# Patient Record
Sex: MALE | Race: WHITE | Employment: FULL TIME | URBAN - METROPOLITAN AREA
[De-identification: names, ages, dates, MRNs, and addresses within clinical notes are randomized per-mention and may not be internally consistent; named-entity substitution may affect disease eponyms.]

---

## 2018-03-09 ENCOUNTER — OFFICE VISIT (OUTPATIENT)
Dept: INTERNAL MEDICINE CLINIC | Facility: CLINIC | Age: 43
End: 2018-03-09

## 2018-03-09 VITALS
OXYGEN SATURATION: 98 % | TEMPERATURE: 99 F | SYSTOLIC BLOOD PRESSURE: 182 MMHG | HEIGHT: 77 IN | RESPIRATION RATE: 16 BRPM | WEIGHT: 315 LBS | BODY MASS INDEX: 37.19 KG/M2 | DIASTOLIC BLOOD PRESSURE: 120 MMHG | HEART RATE: 93 BPM

## 2018-03-09 DIAGNOSIS — Z00.00 LABORATORY EXAMINATION ORDERED AS PART OF A ROUTINE GENERAL MEDICAL EXAMINATION: ICD-10-CM

## 2018-03-09 DIAGNOSIS — I10 ESSENTIAL HYPERTENSION: Primary | ICD-10-CM

## 2018-03-09 PROCEDURE — 99203 OFFICE O/P NEW LOW 30 MIN: CPT | Performed by: INTERNAL MEDICINE

## 2018-03-09 RX ORDER — METOPROLOL SUCCINATE 50 MG/1
50 TABLET, EXTENDED RELEASE ORAL DAILY
Qty: 30 TABLET | Refills: 2 | Status: SHIPPED | OUTPATIENT
Start: 2018-03-09 | End: 2018-03-26

## 2018-03-09 NOTE — PROGRESS NOTES
Scott Wegener JUAN 1975 is a 43year old male.     Patient presents with:  Blood Pressure       HPI:   Incidental finding of elevated blood pressure at the [employer] physical in January    Current Outpatient Prescriptions:  Metoprolol Succinate ER 50 MG Oral A1C; Future  -     LIPID PANEL; Future  -     COMP METABOLIC PANEL (14); Future  -     CBC WITH DIFFERENTIAL WITH PLATELET;  Future  -     URINALYSIS, ROUTINE; Future        Patient Instructions   That up CPX after blood testsset       The patient indicates

## 2018-03-14 ENCOUNTER — LAB ENCOUNTER (OUTPATIENT)
Dept: LAB | Age: 43
End: 2018-03-14
Attending: INTERNAL MEDICINE
Payer: COMMERCIAL

## 2018-03-14 DIAGNOSIS — Z00.00 LABORATORY EXAMINATION ORDERED AS PART OF A ROUTINE GENERAL MEDICAL EXAMINATION: ICD-10-CM

## 2018-03-14 LAB
ALBUMIN SERPL-MCNC: 4 G/DL (ref 3.5–4.8)
ALP LIVER SERPL-CCNC: 89 U/L (ref 45–117)
ALT SERPL-CCNC: 57 U/L (ref 17–63)
AST SERPL-CCNC: 32 U/L (ref 15–41)
BASOPHILS # BLD AUTO: 0.03 X10(3) UL (ref 0–0.1)
BASOPHILS NFR BLD AUTO: 0.4 %
BILIRUB SERPL-MCNC: 0.5 MG/DL (ref 0.1–2)
BILIRUB UR QL STRIP.AUTO: NEGATIVE
BUN BLD-MCNC: 14 MG/DL (ref 8–20)
CALCIUM BLD-MCNC: 9.4 MG/DL (ref 8.3–10.3)
CHLORIDE: 101 MMOL/L (ref 101–111)
CHOLEST SMN-MCNC: 163 MG/DL (ref ?–200)
CLARITY UR REFRACT.AUTO: CLEAR
CO2: 27 MMOL/L (ref 22–32)
COLOR UR AUTO: YELLOW
CREAT BLD-MCNC: 0.99 MG/DL (ref 0.7–1.3)
EOSINOPHIL # BLD AUTO: 0.07 X10(3) UL (ref 0–0.3)
EOSINOPHIL NFR BLD AUTO: 1 %
ERYTHROCYTE [DISTWIDTH] IN BLOOD BY AUTOMATED COUNT: 11.9 % (ref 11.5–16)
GLUCOSE BLD-MCNC: 87 MG/DL (ref 70–99)
GLUCOSE UR STRIP.AUTO-MCNC: NEGATIVE MG/DL
HCT VFR BLD AUTO: 48.6 % (ref 37–53)
HDLC SERPL-MCNC: 35 MG/DL (ref 45–?)
HDLC SERPL: 4.66 {RATIO} (ref ?–4.97)
HGB BLD-MCNC: 16.3 G/DL (ref 13–17)
IMMATURE GRANULOCYTE COUNT: 0.02 X10(3) UL (ref 0–1)
IMMATURE GRANULOCYTE RATIO %: 0.3 %
LDLC SERPL CALC-MCNC: 98 MG/DL (ref ?–130)
LYMPHOCYTES # BLD AUTO: 1.82 X10(3) UL (ref 0.9–4)
LYMPHOCYTES NFR BLD AUTO: 26.1 %
M PROTEIN MFR SERPL ELPH: 8.1 G/DL (ref 6.1–8.3)
MCH RBC QN AUTO: 31 PG (ref 27–33.2)
MCHC RBC AUTO-ENTMCNC: 33.5 G/DL (ref 31–37)
MCV RBC AUTO: 92.4 FL (ref 80–99)
MONOCYTES # BLD AUTO: 0.62 X10(3) UL (ref 0.1–1)
MONOCYTES NFR BLD AUTO: 8.9 %
NEUTROPHIL ABS PRELIM: 4.4 X10 (3) UL (ref 1.3–6.7)
NEUTROPHILS # BLD AUTO: 4.4 X10(3) UL (ref 1.3–6.7)
NEUTROPHILS NFR BLD AUTO: 63.3 %
NITRITE UR QL STRIP.AUTO: NEGATIVE
NONHDLC SERPL-MCNC: 128 MG/DL (ref ?–130)
PH UR STRIP.AUTO: 5 [PH] (ref 4.5–8)
PLATELET # BLD AUTO: 203 10(3)UL (ref 150–450)
POTASSIUM SERPL-SCNC: 4.4 MMOL/L (ref 3.6–5.1)
PROT UR STRIP.AUTO-MCNC: NEGATIVE MG/DL
RBC # BLD AUTO: 5.26 X10(6)UL (ref 4.3–5.7)
RED CELL DISTRIBUTION WIDTH-SD: 40.4 FL (ref 35.1–46.3)
SODIUM SERPL-SCNC: 136 MMOL/L (ref 136–144)
SP GR UR STRIP.AUTO: 1.02 (ref 1–1.03)
THYROXINE (T4): 8.5 UG/DL (ref 4.5–10.9)
TRIGL SERPL-MCNC: 148 MG/DL (ref ?–150)
TSI SER-ACNC: 2.78 MIU/ML (ref 0.35–5.5)
UROBILINOGEN UR STRIP.AUTO-MCNC: <2 MG/DL
VLDLC SERPL CALC-MCNC: 30 MG/DL (ref 5–40)
WBC # BLD AUTO: 7 X10(3) UL (ref 4–13)

## 2018-03-14 PROCEDURE — 84436 ASSAY OF TOTAL THYROXINE: CPT | Performed by: INTERNAL MEDICINE

## 2018-03-14 PROCEDURE — 83036 HEMOGLOBIN GLYCOSYLATED A1C: CPT | Performed by: INTERNAL MEDICINE

## 2018-03-14 PROCEDURE — 36415 COLL VENOUS BLD VENIPUNCTURE: CPT | Performed by: INTERNAL MEDICINE

## 2018-03-14 PROCEDURE — 80050 GENERAL HEALTH PANEL: CPT | Performed by: INTERNAL MEDICINE

## 2018-03-14 PROCEDURE — 80061 LIPID PANEL: CPT | Performed by: INTERNAL MEDICINE

## 2018-03-14 PROCEDURE — 81001 URINALYSIS AUTO W/SCOPE: CPT | Performed by: INTERNAL MEDICINE

## 2018-03-15 LAB
EST. AVERAGE GLUCOSE BLD GHB EST-MCNC: 123 MG/DL (ref 68–126)
HBA1C MFR BLD HPLC: 5.9 % (ref ?–5.7)

## 2018-03-20 ENCOUNTER — TELEPHONE (OUTPATIENT)
Dept: INTERNAL MEDICINE CLINIC | Facility: CLINIC | Age: 43
End: 2018-03-20

## 2018-03-26 ENCOUNTER — OFFICE VISIT (OUTPATIENT)
Dept: INTERNAL MEDICINE CLINIC | Facility: CLINIC | Age: 43
End: 2018-03-26

## 2018-03-26 VITALS
SYSTOLIC BLOOD PRESSURE: 170 MMHG | TEMPERATURE: 99 F | HEART RATE: 74 BPM | DIASTOLIC BLOOD PRESSURE: 110 MMHG | BODY MASS INDEX: 37.19 KG/M2 | HEIGHT: 77 IN | RESPIRATION RATE: 16 BRPM | WEIGHT: 315 LBS | OXYGEN SATURATION: 94 %

## 2018-03-26 DIAGNOSIS — Z00.00 ROUTINE GENERAL MEDICAL EXAMINATION AT A HEALTH CARE FACILITY: Primary | ICD-10-CM

## 2018-03-26 DIAGNOSIS — R82.90 ABNORMAL URINE: ICD-10-CM

## 2018-03-26 DIAGNOSIS — I10 ESSENTIAL HYPERTENSION: ICD-10-CM

## 2018-03-26 PROCEDURE — 93000 ELECTROCARDIOGRAM COMPLETE: CPT | Performed by: INTERNAL MEDICINE

## 2018-03-26 PROCEDURE — 99396 PREV VISIT EST AGE 40-64: CPT | Performed by: INTERNAL MEDICINE

## 2018-03-26 RX ORDER — TRIAMTERENE AND HYDROCHLOROTHIAZIDE 37.5; 25 MG/1; MG/1
CAPSULE ORAL
Qty: 30 CAPSULE | Refills: 11 | Status: SHIPPED | OUTPATIENT
Start: 2018-03-26 | End: 2018-04-12

## 2018-03-26 RX ORDER — METOPROLOL SUCCINATE 50 MG/1
100 TABLET, EXTENDED RELEASE ORAL DAILY
Qty: 60 TABLET | Refills: 2 | COMMUNITY
Start: 2018-03-26 | End: 2018-04-12

## 2018-03-26 NOTE — PROGRESS NOTES
Scott Wegener JUAN 1975 is a 43year old male. Patient presents with:  Physical      HPI:    see below     Current Outpatient Prescriptions:  Metoprolol Succinate ER 50 MG Oral Tablet 24 Hr Take 2 tablets (100 mg total) by mouth daily.  Disp: 60 table Patient denies blood in urine, burning on urination, difficulty urinating, urinary frequency , urinary incontinence/no history of kidney disease or genital abnormalities. Dysuria none. Nocturia None.    Musculoskeletal:   Patient denies arthritis , back p Varicose veins: absent .    MUSCULOSKELETAL:   Cervical spines: normal.   L-S spines: normal.   Lower extremity joints: normal.   Upper extremity joints: normal.   NEUROLOGICAL:   Cognitive function  Mood /affect -thought :perception :normal  Appearance-o

## 2018-03-29 ENCOUNTER — APPOINTMENT (OUTPATIENT)
Dept: LAB | Age: 43
End: 2018-03-29
Attending: INTERNAL MEDICINE
Payer: COMMERCIAL

## 2018-03-29 DIAGNOSIS — R82.90 ABNORMAL URINE: ICD-10-CM

## 2018-03-29 PROCEDURE — 81003 URINALYSIS AUTO W/O SCOPE: CPT | Performed by: INTERNAL MEDICINE

## 2018-03-29 PROCEDURE — 87086 URINE CULTURE/COLONY COUNT: CPT | Performed by: INTERNAL MEDICINE

## 2018-03-30 LAB
BILIRUB UR QL STRIP.AUTO: NEGATIVE
CLARITY UR REFRACT.AUTO: CLEAR
COLOR UR AUTO: YELLOW
GLUCOSE UR STRIP.AUTO-MCNC: NEGATIVE MG/DL
KETONES UR STRIP.AUTO-MCNC: NEGATIVE MG/DL
LEUKOCYTE ESTERASE UR QL STRIP.AUTO: NEGATIVE
NITRITE UR QL STRIP.AUTO: NEGATIVE
PH UR STRIP.AUTO: 8 [PH] (ref 4.5–8)
PROT UR STRIP.AUTO-MCNC: NEGATIVE MG/DL
RBC UR QL AUTO: NEGATIVE
SP GR UR STRIP.AUTO: 1.01 (ref 1–1.03)
UROBILINOGEN UR STRIP.AUTO-MCNC: <2 MG/DL

## 2018-04-12 ENCOUNTER — OFFICE VISIT (OUTPATIENT)
Dept: INTERNAL MEDICINE CLINIC | Facility: CLINIC | Age: 43
End: 2018-04-12

## 2018-04-12 VITALS
SYSTOLIC BLOOD PRESSURE: 166 MMHG | HEIGHT: 77 IN | HEART RATE: 102 BPM | OXYGEN SATURATION: 96 % | TEMPERATURE: 99 F | BODY MASS INDEX: 37.19 KG/M2 | RESPIRATION RATE: 20 BRPM | WEIGHT: 315 LBS | DIASTOLIC BLOOD PRESSURE: 110 MMHG

## 2018-04-12 DIAGNOSIS — I10 ESSENTIAL HYPERTENSION: ICD-10-CM

## 2018-04-12 PROCEDURE — 99213 OFFICE O/P EST LOW 20 MIN: CPT | Performed by: INTERNAL MEDICINE

## 2018-04-12 RX ORDER — TRIAMTERENE AND HYDROCHLOROTHIAZIDE 37.5; 25 MG/1; MG/1
CAPSULE ORAL
Qty: 30 CAPSULE | Refills: 11 | COMMUNITY
Start: 2018-04-12 | End: 2018-05-17

## 2018-04-12 RX ORDER — METOPROLOL SUCCINATE 50 MG/1
200 TABLET, EXTENDED RELEASE ORAL DAILY
Qty: 60 TABLET | Refills: 2 | COMMUNITY
Start: 2018-04-12 | End: 2018-04-14

## 2018-04-12 NOTE — PROGRESS NOTES
Scott Wegener JUAN 1975 is a 43year old male. Patient presents with:   Follow - Up: Est pt. blood pressure follow up       HPI:   bp ck     Current Outpatient Prescriptions:  Metoprolol Succinate ER 50 MG Oral Tablet 24 Hr Take 4 tablets (200 mg to 4/14/2018). Saran Mckay MD

## 2018-04-14 ENCOUNTER — OFFICE VISIT (OUTPATIENT)
Dept: INTERNAL MEDICINE CLINIC | Facility: CLINIC | Age: 43
End: 2018-04-14

## 2018-04-14 VITALS
DIASTOLIC BLOOD PRESSURE: 102 MMHG | RESPIRATION RATE: 16 BRPM | HEART RATE: 110 BPM | OXYGEN SATURATION: 98 % | SYSTOLIC BLOOD PRESSURE: 162 MMHG

## 2018-04-14 DIAGNOSIS — I10 ESSENTIAL HYPERTENSION: ICD-10-CM

## 2018-04-14 RX ORDER — AMLODIPINE BESYLATE AND BENAZEPRIL HYDROCHLORIDE 5; 20 MG/1; MG/1
1 CAPSULE ORAL DAILY
Qty: 30 CAPSULE | Refills: 0 | Status: SHIPPED | OUTPATIENT
Start: 2018-04-14 | End: 2018-05-09

## 2018-04-14 RX ORDER — METOPROLOL SUCCINATE 200 MG/1
200 TABLET, EXTENDED RELEASE ORAL DAILY
Qty: 90 TABLET | Refills: 0 | Status: SHIPPED | OUTPATIENT
Start: 2018-04-14 | End: 2018-07-10

## 2018-04-14 NOTE — PROGRESS NOTES
Scott Wegener JUAN 1975 is a 43year old male. Patient presents with: Follow - Up: MEdication       HPI:   BP check    Current Outpatient Prescriptions:  Metoprolol Succinate  MG Oral Tablet 24 Hr Take 1 tablet (200 mg total) by mouth daily. Patient Instructions on file for this visit. The patient indicates understanding of these issues and agrees to the plan. The patient is asked to Return in about 2 days (around 4/16/2018). Sudeep Villareal MD

## 2018-04-30 ENCOUNTER — OFFICE VISIT (OUTPATIENT)
Dept: INTERNAL MEDICINE CLINIC | Facility: CLINIC | Age: 43
End: 2018-04-30

## 2018-04-30 VITALS
BODY MASS INDEX: 37.19 KG/M2 | DIASTOLIC BLOOD PRESSURE: 80 MMHG | HEART RATE: 93 BPM | SYSTOLIC BLOOD PRESSURE: 132 MMHG | TEMPERATURE: 99 F | RESPIRATION RATE: 20 BRPM | OXYGEN SATURATION: 95 % | WEIGHT: 315 LBS | HEIGHT: 77 IN

## 2018-04-30 DIAGNOSIS — I10 ESSENTIAL HYPERTENSION: ICD-10-CM

## 2018-04-30 PROCEDURE — 99213 OFFICE O/P EST LOW 20 MIN: CPT | Performed by: INTERNAL MEDICINE

## 2018-04-30 NOTE — PROGRESS NOTES
Scott Wegener JUAN 1975 is a 43year old male. Patient presents with:  Blood Pressure       HPI:   bp ck     Current Outpatient Prescriptions:  Metoprolol Succinate  MG Oral Tablet 24 Hr Take 1 tablet (200 mg total) by mouth daily.  Disp: 90 t the plan. The patient is asked to Return in about 6 months (around 10/30/2018). Moshe Hyde MD

## 2018-05-09 DIAGNOSIS — I10 ESSENTIAL HYPERTENSION: ICD-10-CM

## 2018-05-10 RX ORDER — AMLODIPINE BESYLATE AND BENAZEPRIL HYDROCHLORIDE 5; 20 MG/1; MG/1
1 CAPSULE ORAL DAILY
Qty: 90 CAPSULE | Refills: 0 | Status: SHIPPED | OUTPATIENT
Start: 2018-05-10 | End: 2018-08-06

## 2018-05-17 DIAGNOSIS — I10 ESSENTIAL HYPERTENSION: ICD-10-CM

## 2018-05-17 NOTE — TELEPHONE ENCOUNTER
From: Martha Hurst  Sent: 5/17/2018 9:46 AM CDT  Subject: Medication Renewal Request    Scott Wegener would like a refill of the following medications:     Triamterene-HCTZ 37.5-25 MG Oral Cap    Preferred pharmacy: [address] [place] [address]

## 2018-05-18 RX ORDER — TRIAMTERENE AND HYDROCHLOROTHIAZIDE 37.5; 25 MG/1; MG/1
CAPSULE ORAL
Qty: 30 CAPSULE | Refills: 5 | Status: SHIPPED
Start: 2018-05-18 | End: 2018-11-20

## 2018-05-18 NOTE — TELEPHONE ENCOUNTER
Triamterene-HCTZ 37.5-25 MG Oral Cap (Discontinued) 30 capsule 11 3/26/2018 4/12/2018    Sig: Every 3 days as needed edema    E-Prescribing Status: Receipt confirmed by pharmacy (3/26/2018  3:33 PM CDT)      Pharmacy has not received this script.  I have re

## 2018-07-10 DIAGNOSIS — I10 ESSENTIAL HYPERTENSION: ICD-10-CM

## 2018-07-10 RX ORDER — METOPROLOL SUCCINATE 200 MG/1
200 TABLET, EXTENDED RELEASE ORAL DAILY
Qty: 90 TABLET | Refills: 1 | Status: SHIPPED | OUTPATIENT
Start: 2018-07-10 | End: 2019-01-03

## 2018-07-10 NOTE — TELEPHONE ENCOUNTER
Refill requested: Metoprolol  mg     Passed protocol      Last refill: 4/14/18 Metoprolol  mg #90 NR    Relevant Labs: CMP 3/14/18   BP Readings from Last 3 Encounters:  04/30/18 : 132/80  04/14/18 : (!) 162/102  04/12/18 : (!) 166/110      Las

## 2018-08-06 DIAGNOSIS — I10 ESSENTIAL HYPERTENSION: ICD-10-CM

## 2018-08-07 RX ORDER — AMLODIPINE BESYLATE AND BENAZEPRIL HYDROCHLORIDE 5; 20 MG/1; MG/1
1 CAPSULE ORAL DAILY
Qty: 90 CAPSULE | Refills: 0 | Status: SHIPPED
Start: 2018-08-07 | End: 2018-11-05

## 2018-08-07 NOTE — TELEPHONE ENCOUNTER
Medication(s) to Refill:   Pending Prescriptions Disp Refills    Amlodipine Besy-Benazepril HCl 5-20 MG Oral Cap 90 capsule 0     Sig: Take 1 capsule by mouth daily.            Last Time Medication was Filled:  05/10/2018      Last Office Visit with PCP: 4/

## 2018-08-09 DIAGNOSIS — I10 ESSENTIAL HYPERTENSION: ICD-10-CM

## 2018-08-09 RX ORDER — AMLODIPINE BESYLATE AND BENAZEPRIL HYDROCHLORIDE 5; 20 MG/1; MG/1
CAPSULE ORAL
Qty: 90 CAPSULE | Refills: 0 | OUTPATIENT
Start: 2018-08-09

## 2018-11-05 ENCOUNTER — OFFICE VISIT (OUTPATIENT)
Dept: INTERNAL MEDICINE CLINIC | Facility: CLINIC | Age: 43
End: 2018-11-05
Payer: COMMERCIAL

## 2018-11-05 VITALS
SYSTOLIC BLOOD PRESSURE: 142 MMHG | HEART RATE: 88 BPM | DIASTOLIC BLOOD PRESSURE: 100 MMHG | OXYGEN SATURATION: 99 % | BODY MASS INDEX: 37.19 KG/M2 | HEIGHT: 77 IN | WEIGHT: 315 LBS | TEMPERATURE: 98 F | RESPIRATION RATE: 20 BRPM

## 2018-11-05 DIAGNOSIS — I10 ESSENTIAL HYPERTENSION: Primary | ICD-10-CM

## 2018-11-05 DIAGNOSIS — E66.09 CLASS 2 OBESITY DUE TO EXCESS CALORIES WITHOUT SERIOUS COMORBIDITY WITH BODY MASS INDEX (BMI) OF 37.0 TO 37.9 IN ADULT: ICD-10-CM

## 2018-11-05 PROCEDURE — 99213 OFFICE O/P EST LOW 20 MIN: CPT | Performed by: INTERNAL MEDICINE

## 2018-11-05 RX ORDER — AMLODIPINE BESYLATE AND BENAZEPRIL HYDROCHLORIDE 5; 20 MG/1; MG/1
1 CAPSULE ORAL DAILY
Qty: 90 CAPSULE | Refills: 0 | Status: SHIPPED | OUTPATIENT
Start: 2018-11-05 | End: 2019-01-29

## 2018-11-05 NOTE — PROGRESS NOTES
Scott Wegener JUAN 1975 is a 43year old male. Patient presents with:  Medication Follow-Up       HPI:   bp ck     Current Outpatient Medications:  Amlodipine Besy-Benazepril HCl 5-20 MG Oral Cap Take 1 capsule by mouth daily.  Disp: 90 capsule Rfl: index (BMI) of 37.0 to 37.9 in adult  -     HEMOGLOBIN A1C; Future  -     INSULIN; Future        Patient Instructions   Await lab work prior to further recommendation including weight counseling       The patient indicates understanding of these issues and

## 2018-11-09 ENCOUNTER — APPOINTMENT (OUTPATIENT)
Dept: LAB | Age: 43
End: 2018-11-09
Attending: INTERNAL MEDICINE
Payer: COMMERCIAL

## 2018-11-09 DIAGNOSIS — I10 ESSENTIAL HYPERTENSION: ICD-10-CM

## 2018-11-09 DIAGNOSIS — E66.09 CLASS 2 OBESITY DUE TO EXCESS CALORIES WITHOUT SERIOUS COMORBIDITY WITH BODY MASS INDEX (BMI) OF 37.0 TO 37.9 IN ADULT: ICD-10-CM

## 2018-11-09 PROCEDURE — 83525 ASSAY OF INSULIN: CPT | Performed by: INTERNAL MEDICINE

## 2018-11-09 PROCEDURE — 80053 COMPREHEN METABOLIC PANEL: CPT | Performed by: INTERNAL MEDICINE

## 2018-11-09 PROCEDURE — 36415 COLL VENOUS BLD VENIPUNCTURE: CPT | Performed by: INTERNAL MEDICINE

## 2018-11-09 PROCEDURE — 83036 HEMOGLOBIN GLYCOSYLATED A1C: CPT | Performed by: INTERNAL MEDICINE

## 2018-11-20 DIAGNOSIS — I10 ESSENTIAL HYPERTENSION: ICD-10-CM

## 2018-11-21 RX ORDER — TRIAMTERENE AND HYDROCHLOROTHIAZIDE 37.5; 25 MG/1; MG/1
CAPSULE ORAL
Qty: 30 CAPSULE | Refills: 5 | Status: SHIPPED | OUTPATIENT
Start: 2018-11-21 | End: 2019-01-03

## 2018-11-21 NOTE — TELEPHONE ENCOUNTER
triamerene hct 37.5-25 mg 1 cap daily filled 5-18-18 30 with 5 refills     LOV 11-5-18     Return in about 1 week (around 11/12/2018) for result     Labs 11-9-18

## 2018-12-11 ENCOUNTER — TELEPHONE (OUTPATIENT)
Dept: INTERNAL MEDICINE CLINIC | Facility: CLINIC | Age: 43
End: 2018-12-11

## 2018-12-11 DIAGNOSIS — E87.5 SERUM POTASSIUM ELEVATED: Primary | ICD-10-CM

## 2018-12-11 DIAGNOSIS — R74.8 ABNORMAL LIVER ENZYMES: ICD-10-CM

## 2018-12-11 NOTE — TELEPHONE ENCOUNTER
Notes recorded by Cele Colbert MD on 11/12/2018 at 11:58 AM CST  Potassium elevated at 5.2 liver enzymes marginally elevated.   Repeat a CMP in 1 week and see me for follow-up.  Rest of the lab work is within acceptable limits and will be discussed durin

## 2018-12-12 NOTE — TELEPHONE ENCOUNTER
Patient called back regarding test results. Received message via [employer], but would like to further discuss.   Please call cell#

## 2018-12-14 ENCOUNTER — APPOINTMENT (OUTPATIENT)
Dept: LAB | Age: 43
End: 2018-12-14
Attending: INTERNAL MEDICINE
Payer: COMMERCIAL

## 2018-12-14 DIAGNOSIS — E87.5 SERUM POTASSIUM ELEVATED: ICD-10-CM

## 2018-12-14 DIAGNOSIS — R74.8 ABNORMAL LIVER ENZYMES: ICD-10-CM

## 2018-12-14 PROCEDURE — 80053 COMPREHEN METABOLIC PANEL: CPT | Performed by: INTERNAL MEDICINE

## 2018-12-14 PROCEDURE — 36415 COLL VENOUS BLD VENIPUNCTURE: CPT | Performed by: INTERNAL MEDICINE

## 2019-01-03 ENCOUNTER — OFFICE VISIT (OUTPATIENT)
Dept: INTERNAL MEDICINE CLINIC | Facility: CLINIC | Age: 44
End: 2019-01-03
Payer: COMMERCIAL

## 2019-01-03 VITALS
HEIGHT: 77 IN | HEART RATE: 81 BPM | TEMPERATURE: 99 F | RESPIRATION RATE: 16 BRPM | OXYGEN SATURATION: 95 % | DIASTOLIC BLOOD PRESSURE: 80 MMHG | SYSTOLIC BLOOD PRESSURE: 120 MMHG | WEIGHT: 315 LBS | BODY MASS INDEX: 37.19 KG/M2

## 2019-01-03 DIAGNOSIS — I10 ESSENTIAL HYPERTENSION: ICD-10-CM

## 2019-01-03 DIAGNOSIS — R79.89 ELEVATED SERUM CREATININE: ICD-10-CM

## 2019-01-03 DIAGNOSIS — E66.09 CLASS 2 OBESITY DUE TO EXCESS CALORIES WITHOUT SERIOUS COMORBIDITY WITH BODY MASS INDEX (BMI) OF 37.0 TO 37.9 IN ADULT: Primary | ICD-10-CM

## 2019-01-03 PROBLEM — E66.812 CLASS 2 OBESITY DUE TO EXCESS CALORIES WITHOUT SERIOUS COMORBIDITY WITH BODY MASS INDEX (BMI) OF 37.0 TO 37.9 IN ADULT: Status: ACTIVE | Noted: 2019-01-03

## 2019-01-03 PROCEDURE — 99213 OFFICE O/P EST LOW 20 MIN: CPT | Performed by: INTERNAL MEDICINE

## 2019-01-03 RX ORDER — METOPROLOL SUCCINATE 200 MG/1
200 TABLET, EXTENDED RELEASE ORAL DAILY
Qty: 90 TABLET | Refills: 1 | Status: SHIPPED | OUTPATIENT
Start: 2019-01-03 | End: 2019-07-03

## 2019-01-03 NOTE — PROGRESS NOTES
Tamiko Macias Wegener DOB 1975 is a 37year old male. Patient presents with:  Test Results       HPI:   bp ck     Current Outpatient Medications:  Metoprolol Succinate  MG Oral Tablet 24 Hr Take 1 tablet (200 mg total) by mouth daily.  Disp: 90 table (8); Future      Discontinued Dyazide in view of elevated creatinine  Patient Instructions   May need to stop the ACE inhibitors in view of the elevated creatinine and possibly consider renal ultrasound and 24-hour creatinine clearance  Next visit weight c

## 2019-01-03 NOTE — PATIENT INSTRUCTIONS
May need to stop the ACE inhibitors in view of the elevated creatinine and possibly consider renal ultrasound and 24-hour creatinine clearance  Next visit weight counseling

## 2019-01-28 DIAGNOSIS — I10 ESSENTIAL HYPERTENSION: ICD-10-CM

## 2019-01-29 RX ORDER — AMLODIPINE BESYLATE AND BENAZEPRIL HYDROCHLORIDE 5; 20 MG/1; MG/1
1 CAPSULE ORAL DAILY
Qty: 90 CAPSULE | Refills: 0 | Status: SHIPPED | OUTPATIENT
Start: 2019-01-29 | End: 2019-04-21

## 2019-01-29 NOTE — TELEPHONE ENCOUNTER
Medication(s) to Refill:   Requested Prescriptions     Pending Prescriptions Disp Refills   • Amlodipine Besy-Benazepril HCl 5-20 MG Oral Cap 90 capsule 0     Sig: Take 1 capsule by mouth daily.          Reason for Medication Refill being sent to Provider /

## 2019-02-01 ENCOUNTER — APPOINTMENT (OUTPATIENT)
Dept: LAB | Age: 44
End: 2019-02-01
Attending: INTERNAL MEDICINE
Payer: COMMERCIAL

## 2019-02-01 DIAGNOSIS — R79.89 ELEVATED SERUM CREATININE: ICD-10-CM

## 2019-02-01 LAB
ANION GAP SERPL CALC-SCNC: 7 MMOL/L (ref 0–18)
BUN BLD-MCNC: 15 MG/DL (ref 8–20)
BUN/CREAT SERPL: 12.3 (ref 10–20)
CALCIUM BLD-MCNC: 9.1 MG/DL (ref 8.3–10.3)
CHLORIDE SERPL-SCNC: 106 MMOL/L (ref 101–111)
CO2 SERPL-SCNC: 26 MMOL/L (ref 22–32)
CREAT BLD-MCNC: 1.22 MG/DL (ref 0.7–1.3)
GLUCOSE BLD-MCNC: 92 MG/DL (ref 70–99)
OSMOLALITY SERPL CALC.SUM OF ELEC: 288 MOSM/KG (ref 275–295)
POTASSIUM SERPL-SCNC: 4.8 MMOL/L (ref 3.6–5.1)
SODIUM SERPL-SCNC: 139 MMOL/L (ref 136–144)

## 2019-02-01 PROCEDURE — 80048 BASIC METABOLIC PNL TOTAL CA: CPT | Performed by: INTERNAL MEDICINE

## 2019-02-01 PROCEDURE — 36415 COLL VENOUS BLD VENIPUNCTURE: CPT | Performed by: INTERNAL MEDICINE

## 2019-02-08 ENCOUNTER — OFFICE VISIT (OUTPATIENT)
Dept: INTERNAL MEDICINE CLINIC | Facility: CLINIC | Age: 44
End: 2019-02-08
Payer: COMMERCIAL

## 2019-02-08 VITALS
SYSTOLIC BLOOD PRESSURE: 142 MMHG | RESPIRATION RATE: 16 BRPM | DIASTOLIC BLOOD PRESSURE: 90 MMHG | HEART RATE: 72 BPM | BODY MASS INDEX: 37.19 KG/M2 | HEIGHT: 77 IN | WEIGHT: 315 LBS | TEMPERATURE: 99 F

## 2019-02-08 DIAGNOSIS — I10 ESSENTIAL HYPERTENSION: Primary | ICD-10-CM

## 2019-02-08 DIAGNOSIS — E66.09 CLASS 2 OBESITY DUE TO EXCESS CALORIES WITHOUT SERIOUS COMORBIDITY WITH BODY MASS INDEX (BMI) OF 37.0 TO 37.9 IN ADULT: ICD-10-CM

## 2019-02-08 PROBLEM — R79.89 ELEVATED SERUM CREATININE: Status: RESOLVED | Noted: 2019-01-03 | Resolved: 2019-02-08

## 2019-02-08 PROCEDURE — 99213 OFFICE O/P EST LOW 20 MIN: CPT | Performed by: INTERNAL MEDICINE

## 2019-02-08 NOTE — PATIENT INSTRUCTIONS
GENERAL INFORMATION:   What is obesity and how is it diagnosed? Obesity is when you have too much body fat. Obesity puts you at a higher risk for serious medical problems, including diabetes and heart disease.  Your caregiver will use your height and weigh lose weight, your caregiver may suggest medicine. Medicine may help you lose weight when it is used with diet and exercise. You may also need other medicines to treat health problems caused by obesity. Ask your caregiver for more information.     ¨ Appetite [phone]  Web Address: [email or web address]

## 2019-02-08 NOTE — PROGRESS NOTES
Rush Favorite Wegener DOB 1975 is a 37year old male. Patient presents with:  Lab Results       HPI:   bp ck     Current Outpatient Medications:  Amlodipine Besy-Benazepril HCl 5-20 MG Oral Cap Take 1 capsule by mouth daily.  Disp: 90 capsule Rfl: 0   Meto diagnosed? Obesity is when you have too much body fat. Obesity puts you at a higher risk for serious medical problems, including diabetes and heart disease. Your caregiver will use your height and weight to calculate your BMI (body mass index).  Your BMI i Medicine may help you lose weight when it is used with diet and exercise. You may also need other medicines to treat health problems caused by obesity. Ask your caregiver for more information. ¨ Appetite suppressant:   This medicine helps you feel full, The patient indicates understanding of these issues and agrees to the plan. The patient is asked to Return in about 6 months (around 8/8/2019). Tera So MD

## 2019-04-10 DIAGNOSIS — I10 ESSENTIAL HYPERTENSION: ICD-10-CM

## 2019-04-11 RX ORDER — TRIAMTERENE AND HYDROCHLOROTHIAZIDE 37.5; 25 MG/1; MG/1
CAPSULE ORAL
Qty: 30 CAPSULE | Refills: 4 | OUTPATIENT
Start: 2019-04-11

## 2019-04-21 DIAGNOSIS — I10 ESSENTIAL HYPERTENSION: ICD-10-CM

## 2019-04-22 RX ORDER — AMLODIPINE BESYLATE AND BENAZEPRIL HYDROCHLORIDE 5; 20 MG/1; MG/1
CAPSULE ORAL
Qty: 90 CAPSULE | Refills: 1 | Status: SHIPPED | OUTPATIENT
Start: 2019-04-22 | End: 2019-09-26

## 2019-04-22 NOTE — TELEPHONE ENCOUNTER
Passed protocol    Medication(s) to Refill:   Requested Prescriptions     Pending Prescriptions Disp Refills   • AMLODIPINE BESY-BENAZEPRIL HCL 5-20 MG Oral Cap [Pharmacy Med Name: AMLODIPINE-BENAZEPRIL 5-20 MG] 90 capsule 0     Sig: TAKE 1 CAPSULE BY MOUT

## 2019-07-03 DIAGNOSIS — I10 ESSENTIAL HYPERTENSION: ICD-10-CM

## 2019-07-03 RX ORDER — METOPROLOL SUCCINATE 200 MG/1
TABLET, EXTENDED RELEASE ORAL
Qty: 90 TABLET | Refills: 1 | Status: SHIPPED | OUTPATIENT
Start: 2019-07-03 | End: 2019-12-12

## 2019-07-03 NOTE — TELEPHONE ENCOUNTER
Last OV: 2/8/19 with Dr. Narciso Kaminski  Last refill date: 1/3/19     #/refills: #90, 1 refill  When pt was asked to return for OV: 6 months  Upcoming appt/reason: no upcoming appt  LAST LABS 2/1/19 (BMP)

## 2019-09-26 ENCOUNTER — OFFICE VISIT (OUTPATIENT)
Dept: INTERNAL MEDICINE CLINIC | Facility: CLINIC | Age: 44
End: 2019-09-26
Payer: COMMERCIAL

## 2019-09-26 VITALS
DIASTOLIC BLOOD PRESSURE: 84 MMHG | TEMPERATURE: 99 F | HEIGHT: 77 IN | BODY MASS INDEX: 36.75 KG/M2 | WEIGHT: 311.25 LBS | HEART RATE: 80 BPM | RESPIRATION RATE: 16 BRPM | OXYGEN SATURATION: 97 % | SYSTOLIC BLOOD PRESSURE: 136 MMHG

## 2019-09-26 DIAGNOSIS — Z00.00 LABORATORY EXAMINATION ORDERED AS PART OF A ROUTINE GENERAL MEDICAL EXAMINATION: ICD-10-CM

## 2019-09-26 DIAGNOSIS — E66.09 CLASS 2 OBESITY DUE TO EXCESS CALORIES WITHOUT SERIOUS COMORBIDITY WITH BODY MASS INDEX (BMI) OF 37.0 TO 37.9 IN ADULT: ICD-10-CM

## 2019-09-26 DIAGNOSIS — I10 ESSENTIAL HYPERTENSION: Primary | ICD-10-CM

## 2019-09-26 PROCEDURE — 99213 OFFICE O/P EST LOW 20 MIN: CPT | Performed by: INTERNAL MEDICINE

## 2019-09-26 RX ORDER — AMLODIPINE BESYLATE AND BENAZEPRIL HYDROCHLORIDE 5; 20 MG/1; MG/1
1 CAPSULE ORAL
Qty: 90 CAPSULE | Refills: 1 | Status: SHIPPED | OUTPATIENT
Start: 2019-09-26 | End: 2020-03-26

## 2019-09-26 RX ORDER — METFORMIN HYDROCHLORIDE 750 MG/1
750 TABLET, EXTENDED RELEASE ORAL DAILY
Qty: 30 TABLET | Refills: 0 | Status: SHIPPED | OUTPATIENT
Start: 2019-09-26 | End: 2019-10-15

## 2019-09-26 NOTE — PROGRESS NOTES
Susanne Olden Wegener  1975 is a 37year old male. Patient presents with:  Weight Problem       HPI:   bp ck   Has lost weight however seems to have hit a plateau.   Seems to be very motivated and sensible about his dietary intake    Current Outpatient HCl 5-20 MG Oral Cap; Take 1 capsule by mouth once daily. Laboratory examination ordered as part of a routine general medical examination  -     ASSAY, THYROID STIM HORMONE; Future  -     T4(THYROXINE TOTAL);  Future  -     HEMOGLOBIN A1C; Future  -

## 2019-10-15 DIAGNOSIS — E66.09 CLASS 2 OBESITY DUE TO EXCESS CALORIES WITHOUT SERIOUS COMORBIDITY WITH BODY MASS INDEX (BMI) OF 37.0 TO 37.9 IN ADULT: ICD-10-CM

## 2019-10-16 RX ORDER — METFORMIN HYDROCHLORIDE 750 MG/1
750 TABLET, EXTENDED RELEASE ORAL DAILY
Qty: 30 TABLET | Refills: 0 | Status: SHIPPED | OUTPATIENT
Start: 2019-10-16 | End: 2019-11-10

## 2019-10-16 NOTE — TELEPHONE ENCOUNTER
lvm for patient to have labs completed    Metformin last filled 9/26/19 #30     LOV 9/26/19    Labs ordered not completed

## 2019-10-19 DIAGNOSIS — E66.09 CLASS 2 OBESITY DUE TO EXCESS CALORIES WITHOUT SERIOUS COMORBIDITY WITH BODY MASS INDEX (BMI) OF 37.0 TO 37.9 IN ADULT: ICD-10-CM

## 2019-10-19 RX ORDER — METFORMIN HYDROCHLORIDE 750 MG/1
TABLET, EXTENDED RELEASE ORAL
Qty: 30 TABLET | Refills: 0 | OUTPATIENT
Start: 2019-10-19

## 2019-11-05 ENCOUNTER — LAB ENCOUNTER (OUTPATIENT)
Dept: LAB | Age: 44
End: 2019-11-05
Attending: INTERNAL MEDICINE
Payer: COMMERCIAL

## 2019-11-05 DIAGNOSIS — Z00.00 LABORATORY EXAMINATION ORDERED AS PART OF A ROUTINE GENERAL MEDICAL EXAMINATION: ICD-10-CM

## 2019-11-05 PROCEDURE — 83036 HEMOGLOBIN GLYCOSYLATED A1C: CPT | Performed by: INTERNAL MEDICINE

## 2019-11-05 PROCEDURE — 80050 GENERAL HEALTH PANEL: CPT | Performed by: INTERNAL MEDICINE

## 2019-11-05 PROCEDURE — 80061 LIPID PANEL: CPT | Performed by: INTERNAL MEDICINE

## 2019-11-05 PROCEDURE — 36415 COLL VENOUS BLD VENIPUNCTURE: CPT | Performed by: INTERNAL MEDICINE

## 2019-11-05 PROCEDURE — 81001 URINALYSIS AUTO W/SCOPE: CPT | Performed by: INTERNAL MEDICINE

## 2019-11-05 PROCEDURE — 84436 ASSAY OF TOTAL THYROXINE: CPT | Performed by: INTERNAL MEDICINE

## 2019-11-10 DIAGNOSIS — E66.09 CLASS 2 OBESITY DUE TO EXCESS CALORIES WITHOUT SERIOUS COMORBIDITY WITH BODY MASS INDEX (BMI) OF 37.0 TO 37.9 IN ADULT: ICD-10-CM

## 2019-11-11 RX ORDER — METFORMIN HYDROCHLORIDE 750 MG/1
TABLET, EXTENDED RELEASE ORAL
Qty: 30 TABLET | Refills: 0 | Status: SHIPPED | OUTPATIENT
Start: 2019-11-11 | End: 2019-12-06

## 2019-11-11 NOTE — TELEPHONE ENCOUNTER
Protocol failed - Microalbumin needed    Requesting metFORMIN HCl  MG Oral Tablet 24 Hr  LOV: 9/26/19  RTC: 3 months  Last Relevant Labs: 11/5/19  Filled: 10/16/19 #30 with 0 refills    No future appointments.

## 2019-12-06 DIAGNOSIS — E66.09 CLASS 2 OBESITY DUE TO EXCESS CALORIES WITHOUT SERIOUS COMORBIDITY WITH BODY MASS INDEX (BMI) OF 37.0 TO 37.9 IN ADULT: ICD-10-CM

## 2019-12-09 RX ORDER — METFORMIN HYDROCHLORIDE 750 MG/1
TABLET, EXTENDED RELEASE ORAL
Qty: 30 TABLET | Refills: 0 | Status: SHIPPED | OUTPATIENT
Start: 2019-12-09 | End: 2019-12-27

## 2019-12-09 NOTE — TELEPHONE ENCOUNTER
Protocol failed - Microalbumin procedure in past 12 months or taking ACE/ARB    Requesting METFORMIN HCL  MG Oral Tablet 24 Hr  LOV: 9/26/19  RTC: 3 months  Last Relevant Labs: 11/5/19  Filled: 11/11/19 #30 with 0 refills    Future Appointments   [employer]

## 2019-12-12 DIAGNOSIS — I10 ESSENTIAL HYPERTENSION: ICD-10-CM

## 2019-12-13 RX ORDER — METOPROLOL SUCCINATE 200 MG/1
TABLET, EXTENDED RELEASE ORAL
Qty: 90 TABLET | Refills: 1 | Status: SHIPPED | OUTPATIENT
Start: 2019-12-13 | End: 2020-06-16

## 2019-12-13 NOTE — TELEPHONE ENCOUNTER
Passed protocol    Requesting METOPROLOL SUCCINATE  MG Oral Tablet 24 Hr  LOV: 9/26/19  RTC: 3 months  Last Relevant Labs: 11/5/19  Filled: 7/3/19 #90 with 1 refills    Future Appointments   Date Time Provider Camron Fisher   12/27/2019 11:30 AM

## 2019-12-27 ENCOUNTER — OFFICE VISIT (OUTPATIENT)
Dept: INTERNAL MEDICINE CLINIC | Facility: CLINIC | Age: 44
End: 2019-12-27
Payer: COMMERCIAL

## 2019-12-27 VITALS
TEMPERATURE: 98 F | HEIGHT: 77 IN | RESPIRATION RATE: 16 BRPM | HEART RATE: 88 BPM | SYSTOLIC BLOOD PRESSURE: 120 MMHG | OXYGEN SATURATION: 97 % | BODY MASS INDEX: 36.87 KG/M2 | DIASTOLIC BLOOD PRESSURE: 90 MMHG | WEIGHT: 312.25 LBS

## 2019-12-27 DIAGNOSIS — I10 ESSENTIAL HYPERTENSION: ICD-10-CM

## 2019-12-27 DIAGNOSIS — E66.09 CLASS 2 OBESITY DUE TO EXCESS CALORIES WITHOUT SERIOUS COMORBIDITY WITH BODY MASS INDEX (BMI) OF 37.0 TO 37.9 IN ADULT: ICD-10-CM

## 2019-12-27 DIAGNOSIS — R79.89 CREATININE ELEVATION: ICD-10-CM

## 2019-12-27 DIAGNOSIS — R82.90 ABNORMAL URINE: ICD-10-CM

## 2019-12-27 DIAGNOSIS — Z00.00 ROUTINE GENERAL MEDICAL EXAMINATION AT A HEALTH CARE FACILITY: Primary | ICD-10-CM

## 2019-12-27 DIAGNOSIS — E78.00 HYPERCHOLESTEREMIA: ICD-10-CM

## 2019-12-27 PROBLEM — IMO0002 CREATININE ELEVATION: Status: ACTIVE | Noted: 2019-12-27

## 2019-12-27 PROCEDURE — 93000 ELECTROCARDIOGRAM COMPLETE: CPT | Performed by: INTERNAL MEDICINE

## 2019-12-27 PROCEDURE — 99396 PREV VISIT EST AGE 40-64: CPT | Performed by: INTERNAL MEDICINE

## 2019-12-27 RX ORDER — METFORMIN HYDROCHLORIDE 750 MG/1
750 TABLET, EXTENDED RELEASE ORAL
Qty: 90 TABLET | Refills: 1 | Status: SHIPPED | OUTPATIENT
Start: 2019-12-27 | End: 2020-06-25

## 2019-12-27 NOTE — PATIENT INSTRUCTIONS
pend blood work prior to further recommendations  Enforce diet and exercise for cholesterol prior to initiating any medicines. Recheck lipid panel in 3 months.

## 2019-12-27 NOTE — PROGRESS NOTES
Scott Wegener JUAN 1975 is a 40year old male.   Patient presents with:  Physical      HPI:    see below   Current Outpatient Medications   Medication Sig Dispense Refill   • metFORMIN HCl  MG Oral Tablet 24 Hr Take 1 tablet (750 mg total) by nhung easy bruising. Enlarged lymph nodes none.    Men Only:   Patient denies difficulty with erection, penile discharge, testicular pain   Genitourinary:   Patient denies blood in urine, burning on urination, difficulty urinating, urinary frequency , urinary inc vitilgo of the scrotum noted  Testicles: unremarkable. EXTREMITIES:   Clubbing: none. Cyanosis: absent . Edema: Trace  Pulses: present. Tremors: no.   Varicose veins: absent .    MUSCULOSKELETAL:   Cervical spines: normal.   L-S spines: normal. of these issues and agrees to the plan. The patient is asked to Return in about 2 weeks (around 1/10/2020) for result discussion.       Maryann Smith MD

## 2019-12-30 ENCOUNTER — APPOINTMENT (OUTPATIENT)
Dept: LAB | Age: 44
End: 2019-12-30
Attending: INTERNAL MEDICINE
Payer: COMMERCIAL

## 2019-12-30 DIAGNOSIS — R82.90 ABNORMAL URINE: ICD-10-CM

## 2019-12-30 DIAGNOSIS — R79.89 CREATININE ELEVATION: ICD-10-CM

## 2019-12-30 LAB
ANION GAP SERPL CALC-SCNC: 4 MMOL/L (ref 0–18)
BILIRUB UR QL STRIP.AUTO: NEGATIVE
BUN BLD-MCNC: 21 MG/DL (ref 7–18)
BUN/CREAT SERPL: 16.4 (ref 10–20)
CALCIUM BLD-MCNC: 9.2 MG/DL (ref 8.5–10.1)
CHLORIDE SERPL-SCNC: 102 MMOL/L (ref 98–112)
CLARITY UR REFRACT.AUTO: CLEAR
CO2 SERPL-SCNC: 29 MMOL/L (ref 21–32)
COLOR UR AUTO: YELLOW
CREAT BLD-MCNC: 1.28 MG/DL (ref 0.7–1.3)
GLUCOSE BLD-MCNC: 95 MG/DL (ref 70–99)
GLUCOSE UR STRIP.AUTO-MCNC: NEGATIVE MG/DL
HYALINE CASTS #/AREA URNS AUTO: PRESENT /LPF
KETONES UR STRIP.AUTO-MCNC: NEGATIVE MG/DL
NITRITE UR QL STRIP.AUTO: NEGATIVE
OSMOLALITY SERPL CALC.SUM OF ELEC: 283 MOSM/KG (ref 275–295)
PATIENT FASTING Y/N/NP: NO
PH UR STRIP.AUTO: 5 [PH] (ref 4.5–8)
POTASSIUM SERPL-SCNC: 4.7 MMOL/L (ref 3.5–5.1)
PROT UR STRIP.AUTO-MCNC: NEGATIVE MG/DL
RBC UR QL AUTO: NEGATIVE
SODIUM SERPL-SCNC: 135 MMOL/L (ref 136–145)
SP GR UR STRIP.AUTO: 1.01 (ref 1–1.03)
UROBILINOGEN UR STRIP.AUTO-MCNC: <2 MG/DL

## 2019-12-30 PROCEDURE — 87086 URINE CULTURE/COLONY COUNT: CPT | Performed by: INTERNAL MEDICINE

## 2019-12-30 PROCEDURE — 80048 BASIC METABOLIC PNL TOTAL CA: CPT | Performed by: INTERNAL MEDICINE

## 2019-12-30 PROCEDURE — 36415 COLL VENOUS BLD VENIPUNCTURE: CPT | Performed by: INTERNAL MEDICINE

## 2019-12-30 PROCEDURE — 81001 URINALYSIS AUTO W/SCOPE: CPT | Performed by: INTERNAL MEDICINE

## 2020-01-08 ENCOUNTER — TELEPHONE (OUTPATIENT)
Dept: INTERNAL MEDICINE CLINIC | Facility: CLINIC | Age: 45
End: 2020-01-08

## 2020-01-08 DIAGNOSIS — R82.90 ABNORMAL URINE: Primary | ICD-10-CM

## 2020-01-08 NOTE — TELEPHONE ENCOUNTER
Notes recorded by Dania Sullivan MD on 1/6/2020 at 10:50 AM CST  Reviewed results   Potassium and creatinine are now normal.  Urine culture is negative.  However the urine still shows  WBCs  Like to repeat the urinalysis in about 7 days    Verified pt vie

## 2020-01-17 ENCOUNTER — APPOINTMENT (OUTPATIENT)
Dept: LAB | Age: 45
End: 2020-01-17
Attending: INTERNAL MEDICINE
Payer: COMMERCIAL

## 2020-01-17 DIAGNOSIS — R82.90 ABNORMAL URINE: ICD-10-CM

## 2020-01-17 LAB
BILIRUB UR QL STRIP.AUTO: NEGATIVE
COLOR UR AUTO: YELLOW
GLUCOSE UR STRIP.AUTO-MCNC: NEGATIVE MG/DL
KETONES UR STRIP.AUTO-MCNC: NEGATIVE MG/DL
NITRITE UR QL STRIP.AUTO: NEGATIVE
PH UR STRIP.AUTO: 5 [PH] (ref 4.5–8)
PROT UR STRIP.AUTO-MCNC: NEGATIVE MG/DL
RBC UR QL AUTO: NEGATIVE
SP GR UR STRIP.AUTO: 1.02 (ref 1–1.03)
UROBILINOGEN UR STRIP.AUTO-MCNC: <2 MG/DL

## 2020-01-17 PROCEDURE — 81001 URINALYSIS AUTO W/SCOPE: CPT | Performed by: INTERNAL MEDICINE

## 2020-01-21 ENCOUNTER — OFFICE VISIT (OUTPATIENT)
Dept: INTERNAL MEDICINE CLINIC | Facility: CLINIC | Age: 45
End: 2020-01-21
Payer: COMMERCIAL

## 2020-01-21 VITALS
DIASTOLIC BLOOD PRESSURE: 92 MMHG | BODY MASS INDEX: 37.19 KG/M2 | SYSTOLIC BLOOD PRESSURE: 120 MMHG | TEMPERATURE: 98 F | HEIGHT: 77 IN | WEIGHT: 315 LBS | OXYGEN SATURATION: 98 % | HEART RATE: 83 BPM | RESPIRATION RATE: 18 BRPM

## 2020-01-21 DIAGNOSIS — R82.90 ABNORMAL URINE: ICD-10-CM

## 2020-01-21 DIAGNOSIS — I10 ESSENTIAL HYPERTENSION: Primary | ICD-10-CM

## 2020-01-21 PROBLEM — E66.09 CLASS 2 OBESITY DUE TO EXCESS CALORIES WITHOUT SERIOUS COMORBIDITY WITH BODY MASS INDEX (BMI) OF 37.0 TO 37.9 IN ADULT: Chronic | Status: ACTIVE | Noted: 2019-01-03

## 2020-01-21 PROBLEM — E66.812 CLASS 2 OBESITY DUE TO EXCESS CALORIES WITHOUT SERIOUS COMORBIDITY WITH BODY MASS INDEX (BMI) OF 37.0 TO 37.9 IN ADULT: Chronic | Status: ACTIVE | Noted: 2019-01-03

## 2020-01-21 PROCEDURE — 99213 OFFICE O/P EST LOW 20 MIN: CPT | Performed by: INTERNAL MEDICINE

## 2020-01-21 RX ORDER — SULFAMETHOXAZOLE AND TRIMETHOPRIM 800; 160 MG/1; MG/1
1 TABLET ORAL 2 TIMES DAILY
Qty: 20 TABLET | Refills: 0 | Status: SHIPPED | OUTPATIENT
Start: 2020-01-21 | End: 2020-01-31

## 2020-01-21 NOTE — PATIENT INSTRUCTIONS
HTN  Monitor blood pressure twice a day and send or call office with readings.   Rpt  urine after treatment

## 2020-01-21 NOTE — PROGRESS NOTES
Scott Wegener JUAN 1975 is a 40year old male. Patient presents with:  Test Results       HPI:   BP readings at home are good. Has no current urine symptoms.   Current Outpatient Medications   Medication Sig Dispense Refill   • Sulfamethoxazole-TMP results. Diagnoses and all orders for this visit:    Essential hypertension    Abnormal urine  -     Sulfamethoxazole-TMP -160 MG Oral Tab per tablet;  Take 1 tablet by mouth 2 (two) times daily for 10 days.  -     URINALYSIS, ROUTINE; Future

## 2020-02-20 ENCOUNTER — APPOINTMENT (OUTPATIENT)
Dept: LAB | Age: 45
End: 2020-02-20
Attending: INTERNAL MEDICINE
Payer: COMMERCIAL

## 2020-02-20 DIAGNOSIS — R82.90 ABNORMAL URINE: ICD-10-CM

## 2020-02-20 LAB
BILIRUB UR QL STRIP.AUTO: NEGATIVE
COLOR UR AUTO: YELLOW
GLUCOSE UR STRIP.AUTO-MCNC: NEGATIVE MG/DL
KETONES UR STRIP.AUTO-MCNC: NEGATIVE MG/DL
NITRITE UR QL STRIP.AUTO: NEGATIVE
PH UR STRIP.AUTO: 5 [PH] (ref 4.5–8)
PROT UR STRIP.AUTO-MCNC: NEGATIVE MG/DL
RBC UR QL AUTO: NEGATIVE
SP GR UR STRIP.AUTO: 1.02 (ref 1–1.03)
UROBILINOGEN UR STRIP.AUTO-MCNC: 2 MG/DL

## 2020-02-20 PROCEDURE — 81001 URINALYSIS AUTO W/SCOPE: CPT | Performed by: INTERNAL MEDICINE

## 2020-03-26 DIAGNOSIS — I10 ESSENTIAL HYPERTENSION: ICD-10-CM

## 2020-03-26 RX ORDER — AMLODIPINE BESYLATE AND BENAZEPRIL HYDROCHLORIDE 5; 20 MG/1; MG/1
CAPSULE ORAL
Qty: 90 CAPSULE | Refills: 1 | Status: SHIPPED | OUTPATIENT
Start: 2020-03-26 | End: 2020-09-11

## 2020-03-26 NOTE — TELEPHONE ENCOUNTER
AMLODIPINE-BENAZEPRIL 5-20 MG    Protocol passed     EYH:0/08/3221  RTC: 6 months   Relevant Labs: 11/5/19  Filled: 9/26/19  No future office visit

## 2020-06-15 DIAGNOSIS — I10 ESSENTIAL HYPERTENSION: ICD-10-CM

## 2020-06-16 RX ORDER — METOPROLOL SUCCINATE 200 MG/1
TABLET, EXTENDED RELEASE ORAL
Qty: 90 TABLET | Refills: 1 | Status: SHIPPED | OUTPATIENT
Start: 2020-06-16 | End: 2020-12-17

## 2020-06-16 NOTE — TELEPHONE ENCOUNTER
Passed protocol    Requesting METOPROLOL SUCCINATE  MG Oral Tablet 24 Hr  LOV: 1/21/20  RTC: 6 months  Last Relevant Labs: 12/30/19  Filled: 12/13/19 #90 with 1 refills    No future appointments.

## 2020-06-24 DIAGNOSIS — E66.09 CLASS 2 OBESITY DUE TO EXCESS CALORIES WITHOUT SERIOUS COMORBIDITY WITH BODY MASS INDEX (BMI) OF 37.0 TO 37.9 IN ADULT: ICD-10-CM

## 2020-06-24 NOTE — TELEPHONE ENCOUNTER
METFORMIN HCL  MG TABLET  Protocol Failed     LOV: 1/21/2020   RTC: 6 months   Upcoming OV: none scheduled  Filled: 12/27/2019 #90, 1 refill   Recent labs:  BMP 12/30/2019

## 2020-06-25 RX ORDER — METFORMIN HYDROCHLORIDE 750 MG/1
750 TABLET, EXTENDED RELEASE ORAL
Qty: 90 TABLET | Refills: 1 | Status: SHIPPED | OUTPATIENT
Start: 2020-06-25 | End: 2020-12-17

## 2020-09-11 DIAGNOSIS — I10 ESSENTIAL HYPERTENSION: ICD-10-CM

## 2020-09-11 RX ORDER — AMLODIPINE BESYLATE AND BENAZEPRIL HYDROCHLORIDE 5; 20 MG/1; MG/1
CAPSULE ORAL
Qty: 90 CAPSULE | Refills: 1 | Status: SHIPPED | OUTPATIENT
Start: 2020-09-11 | End: 2021-03-10

## 2020-09-11 NOTE — TELEPHONE ENCOUNTER
Failed protocol     Last refill:  3/26/2020 Amlodipine Benazepril #90 1R    LOV:   1/21/2020 Dr Omkar Calzada RTC 6 months  No FOV scheduled

## 2020-12-17 DIAGNOSIS — E66.09 CLASS 2 OBESITY DUE TO EXCESS CALORIES WITHOUT SERIOUS COMORBIDITY WITH BODY MASS INDEX (BMI) OF 37.0 TO 37.9 IN ADULT: ICD-10-CM

## 2020-12-17 DIAGNOSIS — I10 ESSENTIAL HYPERTENSION: ICD-10-CM

## 2020-12-17 DIAGNOSIS — Z00.00 LABORATORY EXAMINATION ORDERED AS PART OF A ROUTINE GENERAL MEDICAL EXAMINATION: Primary | ICD-10-CM

## 2020-12-17 RX ORDER — METFORMIN HYDROCHLORIDE 750 MG/1
750 TABLET, EXTENDED RELEASE ORAL
Qty: 30 TABLET | Refills: 0 | Status: SHIPPED | OUTPATIENT
Start: 2020-12-17 | End: 2021-01-11

## 2020-12-17 RX ORDER — METOPROLOL SUCCINATE 200 MG/1
TABLET, EXTENDED RELEASE ORAL
Qty: 30 TABLET | Refills: 0 | Status: SHIPPED | OUTPATIENT
Start: 2020-12-17 | End: 2021-01-11

## 2020-12-17 NOTE — TELEPHONE ENCOUNTER
Appointment and labs needed - [employer] message sent to pt to contact office to schedule CPX    Medication(s) to Refill:   Requested Prescriptions     Pending Prescriptions Disp Refills   • METOPROLOL SUCCINATE  MG Oral Tablet 24 Hr [Pharmacy Med Name:

## 2021-01-09 DIAGNOSIS — I10 ESSENTIAL HYPERTENSION: ICD-10-CM

## 2021-01-09 DIAGNOSIS — E66.09 CLASS 2 OBESITY DUE TO EXCESS CALORIES WITHOUT SERIOUS COMORBIDITY WITH BODY MASS INDEX (BMI) OF 37.0 TO 37.9 IN ADULT: ICD-10-CM

## 2021-01-11 RX ORDER — METOPROLOL SUCCINATE 200 MG/1
TABLET, EXTENDED RELEASE ORAL
Qty: 30 TABLET | Refills: 0 | Status: SHIPPED | OUTPATIENT
Start: 2021-01-11 | End: 2021-02-03

## 2021-01-11 RX ORDER — METFORMIN HYDROCHLORIDE 750 MG/1
TABLET, EXTENDED RELEASE ORAL
Qty: 30 TABLET | Refills: 0 | Status: SHIPPED | OUTPATIENT
Start: 2021-01-11 | End: 2021-02-04

## 2021-01-11 NOTE — TELEPHONE ENCOUNTER
LVM for pt to give office a call back   Pt due for cpx     Protocol failed   Medication(s) to Refill:   Requested Prescriptions     Pending Prescriptions Disp Refills   • METFORMIN HCL  MG Oral Tablet 24 Hr [Pharmacy Med Name: METFORMIN HCL  MG

## 2021-01-20 ENCOUNTER — LAB ENCOUNTER (OUTPATIENT)
Dept: LAB | Age: 46
End: 2021-01-20
Attending: INTERNAL MEDICINE
Payer: COMMERCIAL

## 2021-01-20 DIAGNOSIS — Z00.00 LABORATORY EXAMINATION ORDERED AS PART OF A ROUTINE GENERAL MEDICAL EXAMINATION: ICD-10-CM

## 2021-01-20 LAB
ALBUMIN SERPL-MCNC: 3.9 G/DL (ref 3.4–5)
ALBUMIN/GLOB SERPL: 0.9 {RATIO} (ref 1–2)
ALP LIVER SERPL-CCNC: 70 U/L
ALT SERPL-CCNC: 55 U/L
ANION GAP SERPL CALC-SCNC: 3 MMOL/L (ref 0–18)
AST SERPL-CCNC: 42 U/L (ref 15–37)
BASOPHILS # BLD AUTO: 0.04 X10(3) UL (ref 0–0.2)
BASOPHILS NFR BLD AUTO: 0.7 %
BILIRUB SERPL-MCNC: 0.5 MG/DL (ref 0.1–2)
BILIRUB UR QL STRIP.AUTO: NEGATIVE
BUN BLD-MCNC: 23 MG/DL (ref 7–18)
BUN/CREAT SERPL: 18 (ref 10–20)
CALCIUM BLD-MCNC: 9.6 MG/DL (ref 8.5–10.1)
CHLORIDE SERPL-SCNC: 104 MMOL/L (ref 98–112)
CHOLEST SMN-MCNC: 197 MG/DL (ref ?–200)
CLARITY UR REFRACT.AUTO: CLEAR
CO2 SERPL-SCNC: 27 MMOL/L (ref 21–32)
COLOR UR AUTO: YELLOW
CREAT BLD-MCNC: 1.28 MG/DL
DEPRECATED RDW RBC AUTO: 42.8 FL (ref 35.1–46.3)
EOSINOPHIL # BLD AUTO: 0.14 X10(3) UL (ref 0–0.7)
EOSINOPHIL NFR BLD AUTO: 2.4 %
ERYTHROCYTE [DISTWIDTH] IN BLOOD BY AUTOMATED COUNT: 12.5 % (ref 11–15)
EST. AVERAGE GLUCOSE BLD GHB EST-MCNC: 120 MG/DL (ref 68–126)
GLOBULIN PLAS-MCNC: 4.4 G/DL (ref 2.8–4.4)
GLUCOSE BLD-MCNC: 108 MG/DL (ref 70–99)
GLUCOSE UR STRIP.AUTO-MCNC: NEGATIVE MG/DL
HBA1C MFR BLD HPLC: 5.8 % (ref ?–5.7)
HCT VFR BLD AUTO: 47.9 %
HDLC SERPL-MCNC: 40 MG/DL (ref 40–59)
HGB BLD-MCNC: 16 G/DL
IMM GRANULOCYTES # BLD AUTO: 0.01 X10(3) UL (ref 0–1)
IMM GRANULOCYTES NFR BLD: 0.2 %
KETONES UR STRIP.AUTO-MCNC: NEGATIVE MG/DL
LDLC SERPL CALC-MCNC: 128 MG/DL (ref ?–100)
LYMPHOCYTES # BLD AUTO: 1.63 X10(3) UL (ref 1–4)
LYMPHOCYTES NFR BLD AUTO: 28.2 %
M PROTEIN MFR SERPL ELPH: 8.3 G/DL (ref 6.4–8.2)
MCH RBC QN AUTO: 31.6 PG (ref 26–34)
MCHC RBC AUTO-ENTMCNC: 33.4 G/DL (ref 31–37)
MCV RBC AUTO: 94.7 FL
MONOCYTES # BLD AUTO: 0.54 X10(3) UL (ref 0.1–1)
MONOCYTES NFR BLD AUTO: 9.4 %
NEUTROPHILS # BLD AUTO: 3.41 X10 (3) UL (ref 1.5–7.7)
NEUTROPHILS # BLD AUTO: 3.41 X10(3) UL (ref 1.5–7.7)
NEUTROPHILS NFR BLD AUTO: 59.1 %
NITRITE UR QL STRIP.AUTO: NEGATIVE
NONHDLC SERPL-MCNC: 157 MG/DL (ref ?–130)
OSMOLALITY SERPL CALC.SUM OF ELEC: 282 MOSM/KG (ref 275–295)
PATIENT FASTING Y/N/NP: YES
PATIENT FASTING Y/N/NP: YES
PH UR STRIP.AUTO: 5 [PH] (ref 4.5–8)
PLATELET # BLD AUTO: 205 10(3)UL (ref 150–450)
POTASSIUM SERPL-SCNC: 5 MMOL/L (ref 3.5–5.1)
PROT UR STRIP.AUTO-MCNC: NEGATIVE MG/DL
RBC # BLD AUTO: 5.06 X10(6)UL
RBC UR QL AUTO: NEGATIVE
SODIUM SERPL-SCNC: 134 MMOL/L (ref 136–145)
SP GR UR STRIP.AUTO: 1.02 (ref 1–1.03)
T4 SERPL-MCNC: 7.7 UG/DL
TRIGL SERPL-MCNC: 146 MG/DL (ref 30–149)
TSI SER-ACNC: 2.56 MIU/ML (ref 0.36–3.74)
UROBILINOGEN UR STRIP.AUTO-MCNC: <2 MG/DL
VLDLC SERPL CALC-MCNC: 29 MG/DL (ref 0–30)
WBC # BLD AUTO: 5.8 X10(3) UL (ref 4–11)

## 2021-01-20 PROCEDURE — 83036 HEMOGLOBIN GLYCOSYLATED A1C: CPT

## 2021-01-20 PROCEDURE — 84436 ASSAY OF TOTAL THYROXINE: CPT

## 2021-01-20 PROCEDURE — 80053 COMPREHEN METABOLIC PANEL: CPT

## 2021-01-20 PROCEDURE — 81001 URINALYSIS AUTO W/SCOPE: CPT

## 2021-01-20 PROCEDURE — 85025 COMPLETE CBC W/AUTO DIFF WBC: CPT

## 2021-01-20 PROCEDURE — 36415 COLL VENOUS BLD VENIPUNCTURE: CPT

## 2021-01-20 PROCEDURE — 80061 LIPID PANEL: CPT

## 2021-01-20 PROCEDURE — 84443 ASSAY THYROID STIM HORMONE: CPT

## 2021-01-25 ENCOUNTER — OFFICE VISIT (OUTPATIENT)
Dept: INTERNAL MEDICINE CLINIC | Facility: CLINIC | Age: 46
End: 2021-01-25
Payer: COMMERCIAL

## 2021-01-25 VITALS
TEMPERATURE: 98 F | HEIGHT: 76.25 IN | DIASTOLIC BLOOD PRESSURE: 92 MMHG | HEART RATE: 83 BPM | RESPIRATION RATE: 16 BRPM | BODY MASS INDEX: 37.84 KG/M2 | SYSTOLIC BLOOD PRESSURE: 144 MMHG | OXYGEN SATURATION: 98 % | WEIGHT: 314 LBS

## 2021-01-25 DIAGNOSIS — E78.00 HYPERCHOLESTEREMIA: ICD-10-CM

## 2021-01-25 DIAGNOSIS — R82.90 ABNORMAL URINE: ICD-10-CM

## 2021-01-25 DIAGNOSIS — Z00.00 ROUTINE GENERAL MEDICAL EXAMINATION AT A HEALTH CARE FACILITY: Primary | ICD-10-CM

## 2021-01-25 DIAGNOSIS — I10 ESSENTIAL HYPERTENSION: Chronic | ICD-10-CM

## 2021-01-25 PROCEDURE — 93000 ELECTROCARDIOGRAM COMPLETE: CPT | Performed by: INTERNAL MEDICINE

## 2021-01-25 PROCEDURE — 90471 IMMUNIZATION ADMIN: CPT | Performed by: INTERNAL MEDICINE

## 2021-01-25 PROCEDURE — 99396 PREV VISIT EST AGE 40-64: CPT | Performed by: INTERNAL MEDICINE

## 2021-01-25 PROCEDURE — 3077F SYST BP >= 140 MM HG: CPT | Performed by: INTERNAL MEDICINE

## 2021-01-25 PROCEDURE — 3080F DIAST BP >= 90 MM HG: CPT | Performed by: INTERNAL MEDICINE

## 2021-01-25 PROCEDURE — 3008F BODY MASS INDEX DOCD: CPT | Performed by: INTERNAL MEDICINE

## 2021-01-25 PROCEDURE — 90686 IIV4 VACC NO PRSV 0.5 ML IM: CPT | Performed by: INTERNAL MEDICINE

## 2021-01-25 RX ORDER — CIPROFLOXACIN 500 MG/1
500 TABLET, FILM COATED ORAL 2 TIMES DAILY
Qty: 20 TABLET | Refills: 0 | Status: SHIPPED | OUTPATIENT
Start: 2021-01-25 | End: 2021-02-08

## 2021-01-25 NOTE — PROGRESS NOTES
Scott Wegener JUAN 1975 is a 39year old male.   Patient presents with:  Physical      HPI:    see below   Current Outpatient Medications   Medication Sig Dispense Refill   • Ciprofloxacin HCl (CIPRO) 500 MG Oral Tab Take 1 tablet (500 mg total) by nhung change in bowel movement noted. Dysphagia none. Wt gain  -lack of exercise secondary to Covid   Hematology:   Patient denies abnormal bleeding, easy bruising. Enlarged lymph nodes none.    Men Only:   Patient denies difficulty with erection, penile discharg Spleen: no hepatosplenomegaly (HSM). Tenderness: absent . GENITOURINARY - MALE:   External genitals: unremarkable. YONI: normal .   Penis: unremarkable. vitilgo of the scrotum noted  Testicles: unremarkable. EXTREMITIES:   Clubbing: none.    Cyanos clinic referral  Patient will repeat urinalysis after treatment      The patient indicates understanding of these issues and agrees to the plan. The patient is asked to Return in about 3 months (around 4/25/2021).       Nyla Zhong MD

## 2021-01-25 NOTE — PATIENT INSTRUCTIONS
Patient wants 3 months of conservative therapy prior to adjustments of medicine for cholesterol and blood pressure. Wants to try weight reduction.   Patient was offered weight loss clinic referral  Patient will repeat urinalysis after treatment

## 2021-02-03 DIAGNOSIS — I10 ESSENTIAL HYPERTENSION: ICD-10-CM

## 2021-02-03 DIAGNOSIS — E66.09 CLASS 2 OBESITY DUE TO EXCESS CALORIES WITHOUT SERIOUS COMORBIDITY WITH BODY MASS INDEX (BMI) OF 37.0 TO 37.9 IN ADULT: ICD-10-CM

## 2021-02-03 RX ORDER — METOPROLOL SUCCINATE 200 MG/1
TABLET, EXTENDED RELEASE ORAL
Qty: 30 TABLET | Refills: 0 | Status: SHIPPED | OUTPATIENT
Start: 2021-02-03 | End: 2021-04-04

## 2021-02-03 NOTE — TELEPHONE ENCOUNTER
Failed protocol for Metformin - Microalbumin procedure in past 12 months or taking ACE/ARB    Medication(s) to Refill:   Requested Prescriptions     Pending Prescriptions Disp Refills   • METOPROLOL SUCCINATE  MG Oral Tablet 24 Hr [Pharmacy Med Name:

## 2021-02-04 RX ORDER — METFORMIN HYDROCHLORIDE 750 MG/1
TABLET, EXTENDED RELEASE ORAL
Qty: 30 TABLET | Refills: 0 | Status: SHIPPED | OUTPATIENT
Start: 2021-02-04 | End: 2021-04-04

## 2021-02-26 ENCOUNTER — LAB ENCOUNTER (OUTPATIENT)
Dept: LAB | Age: 46
End: 2021-02-26
Attending: INTERNAL MEDICINE
Payer: COMMERCIAL

## 2021-02-26 DIAGNOSIS — R82.90 ABNORMAL URINE: ICD-10-CM

## 2021-02-26 LAB
BILIRUB UR QL STRIP.AUTO: NEGATIVE
COLOR UR AUTO: YELLOW
GLUCOSE UR STRIP.AUTO-MCNC: NEGATIVE MG/DL
KETONES UR STRIP.AUTO-MCNC: NEGATIVE MG/DL
LEUKOCYTE ESTERASE UR QL STRIP.AUTO: NEGATIVE
NITRITE UR QL STRIP.AUTO: NEGATIVE
PH UR STRIP.AUTO: 6 [PH] (ref 5–8)
PROT UR STRIP.AUTO-MCNC: NEGATIVE MG/DL
RBC UR QL AUTO: NEGATIVE
SP GR UR STRIP.AUTO: 1.02 (ref 1–1.03)
UROBILINOGEN UR STRIP.AUTO-MCNC: <2 MG/DL

## 2021-02-26 PROCEDURE — 87086 URINE CULTURE/COLONY COUNT: CPT | Performed by: INTERNAL MEDICINE

## 2021-02-26 PROCEDURE — 81003 URINALYSIS AUTO W/O SCOPE: CPT | Performed by: INTERNAL MEDICINE

## 2021-03-10 DIAGNOSIS — I10 ESSENTIAL HYPERTENSION: ICD-10-CM

## 2021-03-10 RX ORDER — AMLODIPINE BESYLATE AND BENAZEPRIL HYDROCHLORIDE 5; 20 MG/1; MG/1
CAPSULE ORAL
Qty: 90 CAPSULE | Refills: 1 | Status: SHIPPED | OUTPATIENT
Start: 2021-03-10 | End: 2021-09-01

## 2021-04-04 DIAGNOSIS — E66.09 CLASS 2 OBESITY DUE TO EXCESS CALORIES WITHOUT SERIOUS COMORBIDITY WITH BODY MASS INDEX (BMI) OF 37.0 TO 37.9 IN ADULT: ICD-10-CM

## 2021-04-04 DIAGNOSIS — I10 ESSENTIAL HYPERTENSION: ICD-10-CM

## 2021-04-05 RX ORDER — METFORMIN HYDROCHLORIDE 750 MG/1
750 TABLET, EXTENDED RELEASE ORAL DAILY
Qty: 30 TABLET | Refills: 0 | Status: SHIPPED | OUTPATIENT
Start: 2021-04-05 | End: 2021-04-28

## 2021-04-05 RX ORDER — METOPROLOL SUCCINATE 200 MG/1
200 TABLET, EXTENDED RELEASE ORAL DAILY
Qty: 30 TABLET | Refills: 0 | Status: SHIPPED | OUTPATIENT
Start: 2021-04-05 | End: 2021-04-28

## 2021-04-05 NOTE — TELEPHONE ENCOUNTER
Failed protocol for metformin - Microalbumin procedure in past 12 months or taking ACE/ARB    Medication(s) to Refill:   Requested Prescriptions     Pending Prescriptions Disp Refills   • Metoprolol Succinate  MG Oral Tablet 24 Hr 30 tablet 0     Sig

## 2021-04-08 ENCOUNTER — IMMUNIZATION (OUTPATIENT)
Dept: LAB | Facility: HOSPITAL | Age: 46
End: 2021-04-08
Attending: HOSPITALIST
Payer: COMMERCIAL

## 2021-04-08 DIAGNOSIS — Z23 NEED FOR VACCINATION: Primary | ICD-10-CM

## 2021-04-08 PROCEDURE — 0011A SARSCOV2 VAC 100MCG/0.5ML IM: CPT

## 2021-04-28 DIAGNOSIS — I10 ESSENTIAL HYPERTENSION: ICD-10-CM

## 2021-04-28 DIAGNOSIS — E66.09 CLASS 2 OBESITY DUE TO EXCESS CALORIES WITHOUT SERIOUS COMORBIDITY WITH BODY MASS INDEX (BMI) OF 37.0 TO 37.9 IN ADULT: ICD-10-CM

## 2021-04-28 RX ORDER — METFORMIN HYDROCHLORIDE 750 MG/1
TABLET, EXTENDED RELEASE ORAL
Qty: 30 TABLET | Refills: 0 | Status: SHIPPED | OUTPATIENT
Start: 2021-04-28 | End: 2021-05-24

## 2021-04-28 RX ORDER — METOPROLOL SUCCINATE 200 MG/1
TABLET, EXTENDED RELEASE ORAL
Qty: 30 TABLET | Refills: 0 | Status: SHIPPED | OUTPATIENT
Start: 2021-04-28 | End: 2021-05-24

## 2021-05-06 ENCOUNTER — IMMUNIZATION (OUTPATIENT)
Dept: LAB | Facility: HOSPITAL | Age: 46
End: 2021-05-06
Attending: EMERGENCY MEDICINE
Payer: COMMERCIAL

## 2021-05-06 DIAGNOSIS — Z23 NEED FOR VACCINATION: Primary | ICD-10-CM

## 2021-05-06 PROCEDURE — 0012A SARSCOV2 VAC 100MCG/0.5ML IM: CPT

## 2021-05-21 DIAGNOSIS — E66.09 CLASS 2 OBESITY DUE TO EXCESS CALORIES WITHOUT SERIOUS COMORBIDITY WITH BODY MASS INDEX (BMI) OF 37.0 TO 37.9 IN ADULT: ICD-10-CM

## 2021-05-21 DIAGNOSIS — I10 ESSENTIAL HYPERTENSION: ICD-10-CM

## 2021-05-22 NOTE — TELEPHONE ENCOUNTER
Protocol failed   Metformin HCL ER 750mg     Protocol passed   Metoprolol succinate ER 200mg     Filled 4/28/2021 #30 0 refills     LOV: 1/25/2021   RTC: 3 months

## 2021-05-24 RX ORDER — METOPROLOL SUCCINATE 200 MG/1
TABLET, EXTENDED RELEASE ORAL
Qty: 30 TABLET | Refills: 0 | Status: SHIPPED | OUTPATIENT
Start: 2021-05-24 | End: 2021-06-16

## 2021-05-24 RX ORDER — METFORMIN HYDROCHLORIDE 750 MG/1
TABLET, EXTENDED RELEASE ORAL
Qty: 30 TABLET | Refills: 0 | Status: SHIPPED | OUTPATIENT
Start: 2021-05-24 | End: 2021-06-16

## 2021-06-15 DIAGNOSIS — E66.09 CLASS 2 OBESITY DUE TO EXCESS CALORIES WITHOUT SERIOUS COMORBIDITY WITH BODY MASS INDEX (BMI) OF 37.0 TO 37.9 IN ADULT: ICD-10-CM

## 2021-06-15 DIAGNOSIS — I10 ESSENTIAL HYPERTENSION: ICD-10-CM

## 2021-06-16 RX ORDER — METOPROLOL SUCCINATE 200 MG/1
TABLET, EXTENDED RELEASE ORAL
Qty: 30 TABLET | Refills: 0 | Status: SHIPPED | OUTPATIENT
Start: 2021-06-16 | End: 2021-07-12

## 2021-06-16 RX ORDER — METFORMIN HYDROCHLORIDE 750 MG/1
TABLET, EXTENDED RELEASE ORAL
Qty: 30 TABLET | Refills: 0 | Status: SHIPPED | OUTPATIENT
Start: 2021-06-16 | End: 2021-07-12

## 2021-06-16 NOTE — TELEPHONE ENCOUNTER
Protocol failed   Metformin HCL ER 750mg filled 5/24/21 # 30 0 refills     Protocol passed   Metoprolol Succinate ER 200mg filled 5/24/21 # 30 0 refills     LOV: 1/25/21   RTC: 3 months     OV scheduled for 6/24/21

## 2021-06-24 ENCOUNTER — OFFICE VISIT (OUTPATIENT)
Dept: INTERNAL MEDICINE CLINIC | Facility: CLINIC | Age: 46
End: 2021-06-24
Payer: COMMERCIAL

## 2021-06-24 VITALS
SYSTOLIC BLOOD PRESSURE: 140 MMHG | BODY MASS INDEX: 37 KG/M2 | WEIGHT: 306 LBS | OXYGEN SATURATION: 98 % | DIASTOLIC BLOOD PRESSURE: 80 MMHG | TEMPERATURE: 97 F | RESPIRATION RATE: 16 BRPM | HEART RATE: 88 BPM

## 2021-06-24 DIAGNOSIS — E78.00 HYPERCHOLESTEREMIA: ICD-10-CM

## 2021-06-24 DIAGNOSIS — L23.7 POISON IVY DERMATITIS: ICD-10-CM

## 2021-06-24 DIAGNOSIS — I10 ESSENTIAL HYPERTENSION: Primary | ICD-10-CM

## 2021-06-24 PROCEDURE — 3077F SYST BP >= 140 MM HG: CPT | Performed by: INTERNAL MEDICINE

## 2021-06-24 PROCEDURE — 3079F DIAST BP 80-89 MM HG: CPT | Performed by: INTERNAL MEDICINE

## 2021-06-24 PROCEDURE — 99213 OFFICE O/P EST LOW 20 MIN: CPT | Performed by: INTERNAL MEDICINE

## 2021-06-24 RX ORDER — METHYLPREDNISOLONE 4 MG/1
TABLET ORAL
Qty: 21 TABLET | Refills: 0 | Status: SHIPPED | OUTPATIENT
Start: 2021-06-24

## 2021-06-24 NOTE — PROGRESS NOTES
Scott Wegener JUAN 1975 is a 39year old male. Patient presents with:  Medication Follow-Up       HPI:   Was also exposed to poison ivy while working on the [employer].   Repairs  Current Outpatient Medications   Medication Sig Dispense Refill   • meth seen today for medication follow-up. Diagnoses and all orders for this visit:    Essential hypertension  -     COMP METABOLIC PANEL (14); Future    Hypercholesteremia  -     HEMOGLOBIN A1C; Future  -     LIPID PANEL;  Future  -     COMP METABOLIC PANEL (

## 2021-07-10 DIAGNOSIS — E66.09 CLASS 2 OBESITY DUE TO EXCESS CALORIES WITHOUT SERIOUS COMORBIDITY WITH BODY MASS INDEX (BMI) OF 37.0 TO 37.9 IN ADULT: ICD-10-CM

## 2021-07-10 DIAGNOSIS — I10 ESSENTIAL HYPERTENSION: ICD-10-CM

## 2021-07-12 RX ORDER — METFORMIN HYDROCHLORIDE 750 MG/1
TABLET, EXTENDED RELEASE ORAL
Qty: 30 TABLET | Refills: 0 | Status: SHIPPED | OUTPATIENT
Start: 2021-07-12 | End: 2021-08-05

## 2021-07-12 RX ORDER — METOPROLOL SUCCINATE 200 MG/1
TABLET, EXTENDED RELEASE ORAL
Qty: 30 TABLET | Refills: 0 | Status: SHIPPED | OUTPATIENT
Start: 2021-07-12 | End: 2021-08-05

## 2021-07-12 NOTE — TELEPHONE ENCOUNTER
Medication(s) to Refill:   Requested Prescriptions     Pending Prescriptions Disp Refills   • METOPROLOL SUCCINATE  MG Oral Tablet 24 Hr [Pharmacy Med Name: METOPROLOL SUCC  MG TAB] 30 tablet 0     Sig: TAKE 1 TABLET BY MOUTH EVERY DAY   • YE

## 2021-08-03 ENCOUNTER — LAB ENCOUNTER (OUTPATIENT)
Dept: LAB | Age: 46
End: 2021-08-03
Attending: INTERNAL MEDICINE
Payer: COMMERCIAL

## 2021-08-03 DIAGNOSIS — I10 ESSENTIAL HYPERTENSION: ICD-10-CM

## 2021-08-03 DIAGNOSIS — E78.00 HYPERCHOLESTEREMIA: ICD-10-CM

## 2021-08-03 LAB
ALBUMIN SERPL-MCNC: 3.9 G/DL (ref 3.4–5)
ALBUMIN/GLOB SERPL: 1 {RATIO} (ref 1–2)
ALP LIVER SERPL-CCNC: 74 U/L
ALT SERPL-CCNC: 42 U/L
ANION GAP SERPL CALC-SCNC: 6 MMOL/L (ref 0–18)
AST SERPL-CCNC: 25 U/L (ref 15–37)
BILIRUB SERPL-MCNC: 0.4 MG/DL (ref 0.1–2)
BUN BLD-MCNC: 12 MG/DL (ref 7–18)
CALCIUM BLD-MCNC: 9.4 MG/DL (ref 8.5–10.1)
CHLORIDE SERPL-SCNC: 105 MMOL/L (ref 98–112)
CHOLEST SMN-MCNC: 184 MG/DL (ref ?–200)
CO2 SERPL-SCNC: 26 MMOL/L (ref 21–32)
CREAT BLD-MCNC: 1.22 MG/DL
EST. AVERAGE GLUCOSE BLD GHB EST-MCNC: 126 MG/DL (ref 68–126)
GLOBULIN PLAS-MCNC: 4.1 G/DL (ref 2.8–4.4)
GLUCOSE BLD-MCNC: 109 MG/DL (ref 70–99)
HBA1C MFR BLD HPLC: 6 % (ref ?–5.7)
HDLC SERPL-MCNC: 34 MG/DL (ref 40–59)
LDLC SERPL CALC-MCNC: 124 MG/DL (ref ?–100)
M PROTEIN MFR SERPL ELPH: 8 G/DL (ref 6.4–8.2)
NONHDLC SERPL-MCNC: 150 MG/DL (ref ?–130)
OSMOLALITY SERPL CALC.SUM OF ELEC: 284 MOSM/KG (ref 275–295)
POTASSIUM SERPL-SCNC: 4.9 MMOL/L (ref 3.5–5.1)
SODIUM SERPL-SCNC: 137 MMOL/L (ref 136–145)
TRIGL SERPL-MCNC: 145 MG/DL (ref 30–149)
VLDLC SERPL CALC-MCNC: 26 MG/DL (ref 0–30)

## 2021-08-03 PROCEDURE — 83036 HEMOGLOBIN GLYCOSYLATED A1C: CPT | Performed by: INTERNAL MEDICINE

## 2021-08-03 PROCEDURE — 80061 LIPID PANEL: CPT | Performed by: INTERNAL MEDICINE

## 2021-08-03 PROCEDURE — 80053 COMPREHEN METABOLIC PANEL: CPT | Performed by: INTERNAL MEDICINE

## 2021-08-04 DIAGNOSIS — E66.09 CLASS 2 OBESITY DUE TO EXCESS CALORIES WITHOUT SERIOUS COMORBIDITY WITH BODY MASS INDEX (BMI) OF 37.0 TO 37.9 IN ADULT: ICD-10-CM

## 2021-08-04 DIAGNOSIS — I10 ESSENTIAL HYPERTENSION: ICD-10-CM

## 2021-08-05 RX ORDER — METOPROLOL SUCCINATE 200 MG/1
TABLET, EXTENDED RELEASE ORAL
Qty: 30 TABLET | Refills: 0 | Status: SHIPPED | OUTPATIENT
Start: 2021-08-05 | End: 2021-08-28

## 2021-08-05 RX ORDER — METFORMIN HYDROCHLORIDE 750 MG/1
TABLET, EXTENDED RELEASE ORAL
Qty: 30 TABLET | Refills: 0 | Status: SHIPPED | OUTPATIENT
Start: 2021-08-05 | End: 2021-08-31

## 2021-08-11 ENCOUNTER — TELEPHONE (OUTPATIENT)
Dept: INTERNAL MEDICINE CLINIC | Facility: CLINIC | Age: 46
End: 2021-08-11

## 2021-08-11 DIAGNOSIS — E78.00 HYPERCHOLESTEREMIA: Primary | ICD-10-CM

## 2021-08-11 NOTE — TELEPHONE ENCOUNTER
----- Message from Minh Caballero MD sent at 8/5/2021  4:45 PM CDT -----  Reviewed results   Hemoglobin A1c 6 fairly acceptable number LDL is marginally elevated. Would recommend diet reinforcement and exercise would like the LDL closer down to 100.   Let

## 2021-08-28 DIAGNOSIS — I10 ESSENTIAL HYPERTENSION: ICD-10-CM

## 2021-08-28 DIAGNOSIS — E66.09 CLASS 2 OBESITY DUE TO EXCESS CALORIES WITHOUT SERIOUS COMORBIDITY WITH BODY MASS INDEX (BMI) OF 37.0 TO 37.9 IN ADULT: ICD-10-CM

## 2021-08-28 RX ORDER — METOPROLOL SUCCINATE 200 MG/1
TABLET, EXTENDED RELEASE ORAL
Qty: 30 TABLET | Refills: 0 | Status: SHIPPED | OUTPATIENT
Start: 2021-08-28 | End: 2021-09-22

## 2021-08-30 NOTE — TELEPHONE ENCOUNTER
Protocol failed     Requesting: Metformin hcl ER 750mg     LOV: 6/24/21   RTC: 6 months   Filled: 8/5/21 #30 0 refills   Recent Labs: 8/3/21     Upcoming OV: none scheduled

## 2021-08-31 DIAGNOSIS — I10 ESSENTIAL HYPERTENSION: ICD-10-CM

## 2021-08-31 RX ORDER — METFORMIN HYDROCHLORIDE 750 MG/1
TABLET, EXTENDED RELEASE ORAL
Qty: 30 TABLET | Refills: 0 | Status: SHIPPED | OUTPATIENT
Start: 2021-08-31 | End: 2021-09-25

## 2021-09-01 RX ORDER — AMLODIPINE BESYLATE AND BENAZEPRIL HYDROCHLORIDE 5; 20 MG/1; MG/1
CAPSULE ORAL
Qty: 90 CAPSULE | Refills: 1 | Status: SHIPPED | OUTPATIENT
Start: 2021-09-01

## 2021-09-21 DIAGNOSIS — I10 ESSENTIAL HYPERTENSION: ICD-10-CM

## 2021-09-22 RX ORDER — METOPROLOL SUCCINATE 200 MG/1
TABLET, EXTENDED RELEASE ORAL
Qty: 30 TABLET | Refills: 0 | Status: SHIPPED | OUTPATIENT
Start: 2021-09-22 | End: 2021-10-14

## 2021-09-22 NOTE — TELEPHONE ENCOUNTER
Protocol passed     Requesting: metoprolol succinate er 200mg     LOV: 6/24/21   RTC: 6 months   Filled: 8/28/21 #30 0 refills   Recent Labs: 8/3/21     Upcoming OV: none noted

## 2021-09-24 DIAGNOSIS — E66.09 CLASS 2 OBESITY DUE TO EXCESS CALORIES WITHOUT SERIOUS COMORBIDITY WITH BODY MASS INDEX (BMI) OF 37.0 TO 37.9 IN ADULT: ICD-10-CM

## 2021-09-25 RX ORDER — METFORMIN HYDROCHLORIDE 750 MG/1
TABLET, EXTENDED RELEASE ORAL
Qty: 30 TABLET | Refills: 0 | Status: SHIPPED | OUTPATIENT
Start: 2021-09-25 | End: 2021-11-04

## 2021-09-25 NOTE — TELEPHONE ENCOUNTER
jory pt. Metformin HCL  mg failed protocol due to  Diabetic Medication Protocol Failed 09/24/2021 12:34 PM   Protocol Details  Microalbumin procedure in past 12 months or taking ACE/ARB   Filled 8-31-21  Qty 30  0 refills  No upcoming appt.

## 2021-10-14 DIAGNOSIS — I10 ESSENTIAL HYPERTENSION: ICD-10-CM

## 2021-10-14 RX ORDER — METOPROLOL SUCCINATE 200 MG/1
TABLET, EXTENDED RELEASE ORAL
Qty: 30 TABLET | Refills: 0 | Status: SHIPPED | OUTPATIENT
Start: 2021-10-14 | End: 2021-11-08

## 2021-11-03 DIAGNOSIS — E66.09 CLASS 2 OBESITY DUE TO EXCESS CALORIES WITHOUT SERIOUS COMORBIDITY WITH BODY MASS INDEX (BMI) OF 37.0 TO 37.9 IN ADULT: ICD-10-CM

## 2021-11-03 NOTE — TELEPHONE ENCOUNTER
Protocol    Diabetic Medication Protocol Failed 11/03/2021 10:43 AM   Protocol Details  Microalbumin procedure in past 12 months or taking ACE/ARB    HgBA1C procedure resulted in past 6 months    Last HgBA1C < 7.5        Requesting:   from pharmacy: Prashant Macedo

## 2021-11-04 RX ORDER — METFORMIN HYDROCHLORIDE 750 MG/1
TABLET, EXTENDED RELEASE ORAL
Qty: 30 TABLET | Refills: 0 | Status: SHIPPED | OUTPATIENT
Start: 2021-11-04 | End: 2021-11-29

## 2021-11-07 DIAGNOSIS — I10 ESSENTIAL HYPERTENSION: ICD-10-CM

## 2021-11-08 RX ORDER — METOPROLOL SUCCINATE 200 MG/1
TABLET, EXTENDED RELEASE ORAL
Qty: 30 TABLET | Refills: 0 | Status: SHIPPED | OUTPATIENT
Start: 2021-11-08 | End: 2021-12-02

## 2021-11-08 NOTE — TELEPHONE ENCOUNTER
Protocol passed     Requesting: metoprolol succinate 200mg ER     LOV: 6/24/21   RTC: 6 months   Filled: 10/14/21 #30 0 refills   Recent Labs: 8/3/21     Upcoming OV: none scheduled

## 2021-11-26 DIAGNOSIS — E66.09 CLASS 2 OBESITY DUE TO EXCESS CALORIES WITHOUT SERIOUS COMORBIDITY WITH BODY MASS INDEX (BMI) OF 37.0 TO 37.9 IN ADULT: ICD-10-CM

## 2021-11-29 RX ORDER — METFORMIN HYDROCHLORIDE 750 MG/1
TABLET, EXTENDED RELEASE ORAL
Qty: 30 TABLET | Refills: 0 | Status: SHIPPED | OUTPATIENT
Start: 2021-11-29 | End: 2021-12-23

## 2021-12-02 DIAGNOSIS — I10 ESSENTIAL HYPERTENSION: ICD-10-CM

## 2021-12-02 RX ORDER — METOPROLOL SUCCINATE 200 MG/1
200 TABLET, EXTENDED RELEASE ORAL DAILY
Qty: 30 TABLET | Refills: 0 | Status: SHIPPED | OUTPATIENT
Start: 2021-12-02 | End: 2021-12-28

## 2021-12-22 ENCOUNTER — LAB ENCOUNTER (OUTPATIENT)
Dept: LAB | Age: 46
End: 2021-12-22
Attending: INTERNAL MEDICINE
Payer: COMMERCIAL

## 2021-12-22 DIAGNOSIS — E78.00 HYPERCHOLESTEREMIA: ICD-10-CM

## 2021-12-22 DIAGNOSIS — E66.09 CLASS 2 OBESITY DUE TO EXCESS CALORIES WITHOUT SERIOUS COMORBIDITY WITH BODY MASS INDEX (BMI) OF 37.0 TO 37.9 IN ADULT: ICD-10-CM

## 2021-12-22 PROCEDURE — 80061 LIPID PANEL: CPT | Performed by: INTERNAL MEDICINE

## 2021-12-23 RX ORDER — METFORMIN HYDROCHLORIDE 750 MG/1
TABLET, EXTENDED RELEASE ORAL
Qty: 30 TABLET | Refills: 0 | Status: SHIPPED | OUTPATIENT
Start: 2021-12-23 | End: 2022-01-19

## 2021-12-23 NOTE — TELEPHONE ENCOUNTER
Protocol failed     Requesting: metformin ER 750mg     LOV: 6/24/21   RTC: 6 months   Filled: 11/29/21 #30 0 refill   Recent Labs: 8/3/21      Upcoming OV: none scheduled

## 2021-12-25 DIAGNOSIS — I10 ESSENTIAL HYPERTENSION: ICD-10-CM

## 2021-12-28 RX ORDER — METOPROLOL SUCCINATE 200 MG/1
200 TABLET, EXTENDED RELEASE ORAL DAILY
Qty: 30 TABLET | Refills: 0 | Status: SHIPPED | OUTPATIENT
Start: 2021-12-28 | End: 2022-01-26

## 2021-12-28 NOTE — TELEPHONE ENCOUNTER
Metoprolol  mg failed protocol due to  Hypertension Medications Protocol Failed 12/25/2021 08:32 AM   Protocol Details  Appointment in past 6 or next 3 months   Filled 12-2-21  Qty 30  0 refills  No upcoming appt.    LOV 6-24-21

## 2022-01-13 ENCOUNTER — TELEPHONE (OUTPATIENT)
Dept: INTERNAL MEDICINE CLINIC | Facility: CLINIC | Age: 47
End: 2022-01-13

## 2022-01-13 DIAGNOSIS — Z00.00 LABORATORY EXAMINATION ORDERED AS PART OF A ROUTINE GENERAL MEDICAL EXAMINATION: ICD-10-CM

## 2022-01-13 DIAGNOSIS — I10 ESSENTIAL HYPERTENSION: Primary | ICD-10-CM

## 2022-01-13 DIAGNOSIS — Z13.29 SCREENING FOR THYROID DISORDER: ICD-10-CM

## 2022-01-13 DIAGNOSIS — R82.90 ABNORMAL URINE: ICD-10-CM

## 2022-01-13 DIAGNOSIS — E78.00 HYPERCHOLESTEREMIA: ICD-10-CM

## 2022-01-13 DIAGNOSIS — E78.00 ELEVATED LDL CHOLESTEROL LEVEL: ICD-10-CM

## 2022-01-14 RX ORDER — ATORVASTATIN CALCIUM 10 MG/1
10 TABLET, FILM COATED ORAL DAILY
Qty: 90 TABLET | Refills: 0 | Status: SHIPPED | OUTPATIENT
Start: 2022-01-14

## 2022-01-18 DIAGNOSIS — E66.09 CLASS 2 OBESITY DUE TO EXCESS CALORIES WITHOUT SERIOUS COMORBIDITY WITH BODY MASS INDEX (BMI) OF 37.0 TO 37.9 IN ADULT: ICD-10-CM

## 2022-01-19 RX ORDER — METFORMIN HYDROCHLORIDE 750 MG/1
TABLET, EXTENDED RELEASE ORAL
Qty: 30 TABLET | Refills: 0 | Status: SHIPPED | OUTPATIENT
Start: 2022-01-19

## 2022-01-19 NOTE — TELEPHONE ENCOUNTER
Protocol failed     Requesting: metformin 750mg     LOV: 6/24/21   RTC: 6 months   Filled: 12/23/21 #30 0 refill   Recent Labs: 8/3/21     Upcoming OV: none scheduled

## 2022-01-25 DIAGNOSIS — I10 ESSENTIAL HYPERTENSION: ICD-10-CM

## 2022-01-26 RX ORDER — METOPROLOL SUCCINATE 200 MG/1
200 TABLET, EXTENDED RELEASE ORAL DAILY
Qty: 30 TABLET | Refills: 0 | Status: SHIPPED | OUTPATIENT
Start: 2022-01-26

## 2022-02-13 RX ORDER — METFORMIN HYDROCHLORIDE 750 MG/1
TABLET, EXTENDED RELEASE ORAL
Qty: 30 TABLET | Refills: 0 | Status: SHIPPED | OUTPATIENT
Start: 2022-02-13 | End: 2022-03-07

## 2022-02-20 DIAGNOSIS — I10 ESSENTIAL HYPERTENSION: ICD-10-CM

## 2022-02-21 RX ORDER — AMLODIPINE BESYLATE AND BENAZEPRIL HYDROCHLORIDE 5; 20 MG/1; MG/1
1 CAPSULE ORAL DAILY
Qty: 90 CAPSULE | Refills: 0 | Status: SHIPPED | OUTPATIENT
Start: 2022-02-21 | End: 2022-05-19

## 2022-02-21 NOTE — TELEPHONE ENCOUNTER
Lm for pt to return call. Pt is due for CPX and fasting labs. Detailed [employer] message sent to pt. Metoprolol succinate  mg failed protocol due to  Hypertension Medications Protocol Failed 02/18/2022 12:34 PM   Protocol Details  Appointment in past 6 or next 3 months   Filled 1-26-22  Qty 30  0 refills  No upcoming appt.    LOV 6-24-21

## 2022-02-21 NOTE — TELEPHONE ENCOUNTER
Protocol failed     Requesting: amlodipine-benazepril 5-20mg      NTC:3/81/98   RTC: 6 months   Filled: 9/1/21 #90 1 refill   Recent Labs: 8/3/21     Upcoming OV: none scheduled

## 2022-02-23 RX ORDER — METOPROLOL SUCCINATE 200 MG/1
200 TABLET, EXTENDED RELEASE ORAL DAILY
Qty: 30 TABLET | Refills: 0 | Status: SHIPPED | OUTPATIENT
Start: 2022-02-23 | End: 2022-03-18

## 2022-03-07 RX ORDER — METFORMIN HYDROCHLORIDE 750 MG/1
TABLET, EXTENDED RELEASE ORAL
Qty: 30 TABLET | Refills: 0 | Status: SHIPPED | OUTPATIENT
Start: 2022-03-07 | End: 2022-03-31

## 2022-03-16 ENCOUNTER — LAB ENCOUNTER (OUTPATIENT)
Dept: LAB | Age: 47
End: 2022-03-16
Attending: INTERNAL MEDICINE
Payer: COMMERCIAL

## 2022-03-16 DIAGNOSIS — R82.90 ABNORMAL URINE: ICD-10-CM

## 2022-03-16 DIAGNOSIS — Z13.29 SCREENING FOR THYROID DISORDER: ICD-10-CM

## 2022-03-16 DIAGNOSIS — Z00.00 LABORATORY EXAMINATION ORDERED AS PART OF A ROUTINE GENERAL MEDICAL EXAMINATION: ICD-10-CM

## 2022-03-16 DIAGNOSIS — E78.00 ELEVATED LDL CHOLESTEROL LEVEL: ICD-10-CM

## 2022-03-16 DIAGNOSIS — E78.00 HYPERCHOLESTEREMIA: ICD-10-CM

## 2022-03-16 DIAGNOSIS — I10 ESSENTIAL HYPERTENSION: ICD-10-CM

## 2022-03-16 LAB
ALBUMIN SERPL-MCNC: 4.2 G/DL (ref 3.4–5)
ALBUMIN/GLOB SERPL: 1.2 {RATIO} (ref 1–2)
ALP LIVER SERPL-CCNC: 75 U/L
ALT SERPL-CCNC: 59 U/L
ANION GAP SERPL CALC-SCNC: 4 MMOL/L (ref 0–18)
AST SERPL-CCNC: 29 U/L (ref 15–37)
BASOPHILS # BLD AUTO: 0.04 X10(3) UL (ref 0–0.2)
BASOPHILS NFR BLD AUTO: 0.6 %
BILIRUB SERPL-MCNC: 0.6 MG/DL (ref 0.1–2)
BILIRUB UR QL STRIP.AUTO: NEGATIVE
BUN BLD-MCNC: 21 MG/DL (ref 7–18)
CALCIUM BLD-MCNC: 9.9 MG/DL (ref 8.5–10.1)
CHLORIDE SERPL-SCNC: 105 MMOL/L (ref 98–112)
CHOLEST SERPL-MCNC: 123 MG/DL (ref ?–200)
CO2 SERPL-SCNC: 27 MMOL/L (ref 21–32)
COLOR UR AUTO: YELLOW
CREAT BLD-MCNC: 1.15 MG/DL
EOSINOPHIL # BLD AUTO: 0.11 X10(3) UL (ref 0–0.7)
EOSINOPHIL NFR BLD AUTO: 1.7 %
ERYTHROCYTE [DISTWIDTH] IN BLOOD BY AUTOMATED COUNT: 12.3 %
EST. AVERAGE GLUCOSE BLD GHB EST-MCNC: 123 MG/DL (ref 68–126)
FASTING PATIENT LIPID ANSWER: YES
FASTING STATUS PATIENT QL REPORTED: YES
GLOBULIN PLAS-MCNC: 3.6 G/DL (ref 2.8–4.4)
GLUCOSE BLD-MCNC: 114 MG/DL (ref 70–99)
GLUCOSE UR STRIP.AUTO-MCNC: NEGATIVE MG/DL
HBA1C MFR BLD: 5.9 % (ref ?–5.7)
HCT VFR BLD AUTO: 47.8 %
HDLC SERPL-MCNC: 34 MG/DL (ref 40–59)
HGB BLD-MCNC: 15.8 G/DL
IMM GRANULOCYTES # BLD AUTO: 0.02 X10(3) UL (ref 0–1)
IMM GRANULOCYTES NFR BLD: 0.3 %
KETONES UR STRIP.AUTO-MCNC: NEGATIVE MG/DL
LDLC SERPL CALC-MCNC: 67 MG/DL (ref ?–100)
LYMPHOCYTES # BLD AUTO: 1.78 X10(3) UL (ref 1–4)
LYMPHOCYTES NFR BLD AUTO: 27.3 %
MCH RBC QN AUTO: 31.7 PG (ref 26–34)
MCHC RBC AUTO-ENTMCNC: 33.1 G/DL (ref 31–37)
MCV RBC AUTO: 95.8 FL
MONOCYTES # BLD AUTO: 0.6 X10(3) UL (ref 0.1–1)
MONOCYTES NFR BLD AUTO: 9.2 %
NEUTROPHILS # BLD AUTO: 3.96 X10 (3) UL (ref 1.5–7.7)
NEUTROPHILS # BLD AUTO: 3.96 X10(3) UL (ref 1.5–7.7)
NEUTROPHILS NFR BLD AUTO: 60.9 %
NITRITE UR QL STRIP.AUTO: NEGATIVE
NONHDLC SERPL-MCNC: 89 MG/DL (ref ?–130)
OSMOLALITY SERPL CALC.SUM OF ELEC: 286 MOSM/KG (ref 275–295)
PH UR STRIP.AUTO: 5 [PH] (ref 5–8)
PLATELET # BLD AUTO: 203 10(3)UL (ref 150–450)
POTASSIUM SERPL-SCNC: 4.8 MMOL/L (ref 3.5–5.1)
PROT SERPL-MCNC: 7.8 G/DL (ref 6.4–8.2)
PROT UR STRIP.AUTO-MCNC: NEGATIVE MG/DL
RBC # BLD AUTO: 4.99 X10(6)UL
RBC UR QL AUTO: NEGATIVE
SODIUM SERPL-SCNC: 136 MMOL/L (ref 136–145)
SP GR UR STRIP.AUTO: 1.02 (ref 1–1.03)
T4 FREE SERPL-MCNC: 1 NG/DL (ref 0.8–1.7)
TRIGL SERPL-MCNC: 120 MG/DL (ref 30–149)
TSI SER-ACNC: 2.98 MIU/ML (ref 0.36–3.74)
UROBILINOGEN UR STRIP.AUTO-MCNC: <2 MG/DL
VLDLC SERPL CALC-MCNC: 18 MG/DL (ref 0–30)
WBC # BLD AUTO: 6.5 X10(3) UL (ref 4–11)

## 2022-03-16 PROCEDURE — 80050 GENERAL HEALTH PANEL: CPT | Performed by: INTERNAL MEDICINE

## 2022-03-16 PROCEDURE — 81001 URINALYSIS AUTO W/SCOPE: CPT | Performed by: INTERNAL MEDICINE

## 2022-03-16 PROCEDURE — 83036 HEMOGLOBIN GLYCOSYLATED A1C: CPT | Performed by: INTERNAL MEDICINE

## 2022-03-16 PROCEDURE — 80061 LIPID PANEL: CPT | Performed by: INTERNAL MEDICINE

## 2022-03-16 PROCEDURE — 84439 ASSAY OF FREE THYROXINE: CPT | Performed by: INTERNAL MEDICINE

## 2022-03-18 RX ORDER — METOPROLOL SUCCINATE 200 MG/1
200 TABLET, EXTENDED RELEASE ORAL DAILY
Qty: 30 TABLET | Refills: 0 | Status: SHIPPED | OUTPATIENT
Start: 2022-03-18

## 2022-03-22 ENCOUNTER — OFFICE VISIT (OUTPATIENT)
Dept: INTERNAL MEDICINE CLINIC | Facility: CLINIC | Age: 47
End: 2022-03-22
Payer: COMMERCIAL

## 2022-03-22 VITALS
SYSTOLIC BLOOD PRESSURE: 136 MMHG | OXYGEN SATURATION: 99 % | DIASTOLIC BLOOD PRESSURE: 88 MMHG | BODY MASS INDEX: 38.36 KG/M2 | HEIGHT: 76 IN | TEMPERATURE: 98 F | WEIGHT: 315 LBS | RESPIRATION RATE: 18 BRPM | HEART RATE: 78 BPM

## 2022-03-22 DIAGNOSIS — E78.00 HYPERCHOLESTEREMIA: ICD-10-CM

## 2022-03-22 DIAGNOSIS — Z00.00 ROUTINE GENERAL MEDICAL EXAMINATION AT A HEALTH CARE FACILITY: Primary | ICD-10-CM

## 2022-03-22 DIAGNOSIS — R82.90 ABNORMAL URINE: ICD-10-CM

## 2022-03-22 DIAGNOSIS — E66.09 CLASS 2 OBESITY DUE TO EXCESS CALORIES WITHOUT SERIOUS COMORBIDITY WITH BODY MASS INDEX (BMI) OF 37.0 TO 37.9 IN ADULT: ICD-10-CM

## 2022-03-22 DIAGNOSIS — I10 ESSENTIAL HYPERTENSION: ICD-10-CM

## 2022-03-22 PROCEDURE — 93000 ELECTROCARDIOGRAM COMPLETE: CPT | Performed by: INTERNAL MEDICINE

## 2022-03-22 PROCEDURE — 3008F BODY MASS INDEX DOCD: CPT | Performed by: INTERNAL MEDICINE

## 2022-03-22 PROCEDURE — 99396 PREV VISIT EST AGE 40-64: CPT | Performed by: INTERNAL MEDICINE

## 2022-03-22 PROCEDURE — 3079F DIAST BP 80-89 MM HG: CPT | Performed by: INTERNAL MEDICINE

## 2022-03-22 PROCEDURE — 3075F SYST BP GE 130 - 139MM HG: CPT | Performed by: INTERNAL MEDICINE

## 2022-03-23 ENCOUNTER — LAB ENCOUNTER (OUTPATIENT)
Dept: LAB | Age: 47
End: 2022-03-23
Attending: INTERNAL MEDICINE
Payer: COMMERCIAL

## 2022-03-23 DIAGNOSIS — R82.90 ABNORMAL URINE: ICD-10-CM

## 2022-03-23 LAB
BILIRUB UR QL STRIP.AUTO: NEGATIVE
COLOR UR AUTO: YELLOW
GLUCOSE UR STRIP.AUTO-MCNC: NEGATIVE MG/DL
KETONES UR STRIP.AUTO-MCNC: NEGATIVE MG/DL
NITRITE UR QL STRIP.AUTO: NEGATIVE
PH UR STRIP.AUTO: 5 [PH] (ref 5–8)
PROT UR STRIP.AUTO-MCNC: NEGATIVE MG/DL
RBC UR QL AUTO: NEGATIVE
SP GR UR STRIP.AUTO: 1.02 (ref 1–1.03)
UROBILINOGEN UR STRIP.AUTO-MCNC: <2 MG/DL

## 2022-03-23 PROCEDURE — 81001 URINALYSIS AUTO W/SCOPE: CPT | Performed by: INTERNAL MEDICINE

## 2022-03-23 PROCEDURE — 87086 URINE CULTURE/COLONY COUNT: CPT | Performed by: INTERNAL MEDICINE

## 2022-03-29 RX ORDER — SULFAMETHOXAZOLE AND TRIMETHOPRIM 800; 160 MG/1; MG/1
1 TABLET ORAL 2 TIMES DAILY
Qty: 28 TABLET | Refills: 0 | Status: SHIPPED | OUTPATIENT
Start: 2022-03-29 | End: 2022-04-12

## 2022-03-31 RX ORDER — METFORMIN HYDROCHLORIDE 750 MG/1
TABLET, EXTENDED RELEASE ORAL
Qty: 30 TABLET | Refills: 0 | Status: SHIPPED | OUTPATIENT
Start: 2022-03-31

## 2022-04-11 RX ORDER — ATORVASTATIN CALCIUM 10 MG/1
TABLET, FILM COATED ORAL
Qty: 90 TABLET | Refills: 0 | Status: SHIPPED | OUTPATIENT
Start: 2022-04-11

## 2022-04-11 NOTE — TELEPHONE ENCOUNTER
Protocol passed     Requesting: atorvastatin 10mg     LOV: 3/22/22   RTC: 6 months   Filled: 1/14/22 #90 0 refills   Recent Labs: 3/16/22     Upcoming OV: none scheduled

## 2022-04-11 NOTE — TELEPHONE ENCOUNTER
LOV: 3/22/2022 with Dr. Mathew Massey  RTC: 6 months  Last Relevant Labs: 3/16/2022  Filled: 3/18/2022     #30 with 0 refills    No future appointments.

## 2022-04-13 RX ORDER — METOPROLOL SUCCINATE 200 MG/1
200 TABLET, EXTENDED RELEASE ORAL DAILY
Qty: 30 TABLET | Refills: 5 | Status: SHIPPED | OUTPATIENT
Start: 2022-04-13

## 2022-04-15 ENCOUNTER — LAB ENCOUNTER (OUTPATIENT)
Dept: LAB | Age: 47
End: 2022-04-15
Attending: INTERNAL MEDICINE
Payer: COMMERCIAL

## 2022-04-15 DIAGNOSIS — R82.90 ABNORMAL URINE: ICD-10-CM

## 2022-04-15 LAB
BILIRUB UR QL STRIP.AUTO: NEGATIVE
CLARITY UR REFRACT.AUTO: CLEAR
COLOR UR AUTO: YELLOW
GLUCOSE UR STRIP.AUTO-MCNC: NEGATIVE MG/DL
KETONES UR STRIP.AUTO-MCNC: NEGATIVE MG/DL
NITRITE UR QL STRIP.AUTO: NEGATIVE
PH UR STRIP.AUTO: 6 [PH] (ref 5–8)
PROT UR STRIP.AUTO-MCNC: NEGATIVE MG/DL
RBC UR QL AUTO: NEGATIVE
SP GR UR STRIP.AUTO: 1.02 (ref 1–1.03)
UROBILINOGEN UR STRIP.AUTO-MCNC: <2 MG/DL

## 2022-04-15 PROCEDURE — 87086 URINE CULTURE/COLONY COUNT: CPT

## 2022-04-15 PROCEDURE — 81001 URINALYSIS AUTO W/SCOPE: CPT

## 2022-04-21 RX ORDER — CIPROFLOXACIN 500 MG/1
500 TABLET, FILM COATED ORAL 2 TIMES DAILY
Qty: 20 TABLET | Refills: 0 | Status: SHIPPED | OUTPATIENT
Start: 2022-04-21 | End: 2022-05-01

## 2022-04-27 RX ORDER — METFORMIN HYDROCHLORIDE 750 MG/1
TABLET, EXTENDED RELEASE ORAL
Qty: 30 TABLET | Refills: 0 | Status: SHIPPED | OUTPATIENT
Start: 2022-04-27

## 2022-04-27 NOTE — TELEPHONE ENCOUNTER
Protocol failed     Requesting: metformin 750mg     LOV:3/22/22   RTC: 6 months   Filled: 3/21/22 #30 0 refills   Recent Labs: 3/16/22     Upcoming OV: none scheduled

## 2022-05-06 ENCOUNTER — LAB ENCOUNTER (OUTPATIENT)
Dept: LAB | Age: 47
End: 2022-05-06
Attending: INTERNAL MEDICINE
Payer: COMMERCIAL

## 2022-05-06 DIAGNOSIS — R82.90 ABNORMAL URINE: ICD-10-CM

## 2022-05-06 LAB
BILIRUB UR QL STRIP.AUTO: NEGATIVE
COLOR UR AUTO: YELLOW
GLUCOSE UR STRIP.AUTO-MCNC: NEGATIVE MG/DL
KETONES UR STRIP.AUTO-MCNC: NEGATIVE MG/DL
NITRITE UR QL STRIP.AUTO: NEGATIVE
PH UR STRIP.AUTO: 5 [PH] (ref 5–8)
PROT UR STRIP.AUTO-MCNC: NEGATIVE MG/DL
RBC UR QL AUTO: NEGATIVE
SP GR UR STRIP.AUTO: 1.01 (ref 1–1.03)
UROBILINOGEN UR STRIP.AUTO-MCNC: <2 MG/DL

## 2022-05-06 PROCEDURE — 87086 URINE CULTURE/COLONY COUNT: CPT | Performed by: INTERNAL MEDICINE

## 2022-05-06 PROCEDURE — 81001 URINALYSIS AUTO W/SCOPE: CPT | Performed by: INTERNAL MEDICINE

## 2022-05-19 DIAGNOSIS — I10 ESSENTIAL HYPERTENSION: ICD-10-CM

## 2022-05-19 RX ORDER — AMLODIPINE BESYLATE AND BENAZEPRIL HYDROCHLORIDE 5; 20 MG/1; MG/1
CAPSULE ORAL
Qty: 90 CAPSULE | Refills: 0 | Status: SHIPPED | OUTPATIENT
Start: 2022-05-19

## 2022-05-20 DIAGNOSIS — E66.09 CLASS 2 OBESITY DUE TO EXCESS CALORIES WITHOUT SERIOUS COMORBIDITY WITH BODY MASS INDEX (BMI) OF 37.0 TO 37.9 IN ADULT: ICD-10-CM

## 2022-05-23 RX ORDER — METFORMIN HYDROCHLORIDE 750 MG/1
TABLET, EXTENDED RELEASE ORAL
Qty: 30 TABLET | Refills: 0 | Status: SHIPPED | OUTPATIENT
Start: 2022-05-23

## 2022-05-23 NOTE — TELEPHONE ENCOUNTER
Protocol failed     Requesting: metformin 750mg     LOV: 3/22/22   RTC: 6 months   Filled: 4/27/22 #30 0 refills   Recent Labs: 3/16/22     Upcoming OV: none scheduled

## 2022-07-18 RX ORDER — ATORVASTATIN CALCIUM 10 MG/1
10 TABLET, FILM COATED ORAL DAILY
Qty: 90 TABLET | Refills: 0 | Status: SHIPPED | OUTPATIENT
Start: 2022-07-18

## 2022-07-18 NOTE — TELEPHONE ENCOUNTER
Protocol passed     Requesting: atorvastatin 10mg     LOV: 3/22/22   RTC: 6 months   Filled: 4/11/22 #90 0 refills   Recent Labs: 3/16/22     Upcoming OV: none scheduled

## 2022-08-07 DIAGNOSIS — E66.09 CLASS 2 OBESITY DUE TO EXCESS CALORIES WITHOUT SERIOUS COMORBIDITY WITH BODY MASS INDEX (BMI) OF 37.0 TO 37.9 IN ADULT: ICD-10-CM

## 2022-08-08 RX ORDER — METFORMIN HYDROCHLORIDE 750 MG/1
750 TABLET, EXTENDED RELEASE ORAL DAILY
Qty: 30 TABLET | Refills: 0 | Status: SHIPPED | OUTPATIENT
Start: 2022-08-08

## 2022-08-08 NOTE — TELEPHONE ENCOUNTER
Protocol failed     Requesting: metformin 750mg     LOV: 3/22/22   RTC: 6 months   Filled: 5/23/22 #30 0 refills   Recent Labs: 4/15/22       Upcoming OV: none scheduled

## 2022-08-13 DIAGNOSIS — I10 ESSENTIAL HYPERTENSION: ICD-10-CM

## 2022-08-13 RX ORDER — AMLODIPINE BESYLATE AND BENAZEPRIL HYDROCHLORIDE 5; 20 MG/1; MG/1
CAPSULE ORAL
Qty: 90 CAPSULE | Refills: 0 | Status: SHIPPED | OUTPATIENT
Start: 2022-08-13

## 2022-08-30 DIAGNOSIS — E66.09 CLASS 2 OBESITY DUE TO EXCESS CALORIES WITHOUT SERIOUS COMORBIDITY WITH BODY MASS INDEX (BMI) OF 37.0 TO 37.9 IN ADULT: ICD-10-CM

## 2022-08-31 RX ORDER — METFORMIN HYDROCHLORIDE 750 MG/1
TABLET, EXTENDED RELEASE ORAL
Qty: 30 TABLET | Refills: 0 | Status: SHIPPED | OUTPATIENT
Start: 2022-08-31

## 2022-08-31 NOTE — TELEPHONE ENCOUNTER
Protocol failed     Requesting: metformin 750mg     LOV: 3/22/22   RTC: 6 months   Filled: 8/8/22 #30 0 refills   Recent Labs:     Upcoming OV: none scheduled

## 2022-09-23 ENCOUNTER — TELEPHONE (OUTPATIENT)
Dept: INTERNAL MEDICINE CLINIC | Facility: CLINIC | Age: 47
End: 2022-09-23

## 2022-09-23 DIAGNOSIS — E66.09 CLASS 2 OBESITY DUE TO EXCESS CALORIES WITHOUT SERIOUS COMORBIDITY WITH BODY MASS INDEX (BMI) OF 37.0 TO 37.9 IN ADULT: ICD-10-CM

## 2022-09-24 RX ORDER — METFORMIN HYDROCHLORIDE 750 MG/1
TABLET, EXTENDED RELEASE ORAL
Qty: 30 TABLET | Refills: 0 | Status: SHIPPED | OUTPATIENT
Start: 2022-09-24 | End: 2022-10-18

## 2022-09-26 NOTE — TELEPHONE ENCOUNTER
Future Appointments   Date Time Provider Camron Fisher   10/25/2022  5:00 PM Raquel Ellison MD [facility] [place]     DM check up.

## 2022-10-06 DIAGNOSIS — I10 ESSENTIAL HYPERTENSION: ICD-10-CM

## 2022-10-06 RX ORDER — METOPROLOL SUCCINATE 200 MG/1
TABLET, EXTENDED RELEASE ORAL
Qty: 90 TABLET | Refills: 1 | Status: SHIPPED | OUTPATIENT
Start: 2022-10-06

## 2022-10-14 RX ORDER — ATORVASTATIN CALCIUM 10 MG/1
TABLET, FILM COATED ORAL
Qty: 90 TABLET | Refills: 0 | Status: SHIPPED | OUTPATIENT
Start: 2022-10-14

## 2022-10-18 DIAGNOSIS — E66.09 CLASS 2 OBESITY DUE TO EXCESS CALORIES WITHOUT SERIOUS COMORBIDITY WITH BODY MASS INDEX (BMI) OF 37.0 TO 37.9 IN ADULT: ICD-10-CM

## 2022-10-18 RX ORDER — METFORMIN HYDROCHLORIDE 750 MG/1
TABLET, EXTENDED RELEASE ORAL
Qty: 30 TABLET | Refills: 0 | Status: SHIPPED | OUTPATIENT
Start: 2022-10-18

## 2022-11-01 ENCOUNTER — OFFICE VISIT (OUTPATIENT)
Dept: INTERNAL MEDICINE CLINIC | Facility: CLINIC | Age: 47
End: 2022-11-01
Payer: COMMERCIAL

## 2022-11-01 VITALS
BODY MASS INDEX: 38.36 KG/M2 | HEART RATE: 96 BPM | OXYGEN SATURATION: 100 % | HEIGHT: 76 IN | WEIGHT: 315 LBS | DIASTOLIC BLOOD PRESSURE: 86 MMHG | TEMPERATURE: 98 F | SYSTOLIC BLOOD PRESSURE: 130 MMHG | RESPIRATION RATE: 18 BRPM

## 2022-11-01 DIAGNOSIS — I10 ESSENTIAL HYPERTENSION: Primary | ICD-10-CM

## 2022-11-01 DIAGNOSIS — E66.09 CLASS 2 OBESITY DUE TO EXCESS CALORIES WITHOUT SERIOUS COMORBIDITY WITH BODY MASS INDEX (BMI) OF 37.0 TO 37.9 IN ADULT: ICD-10-CM

## 2022-11-01 DIAGNOSIS — E78.00 HYPERCHOLESTEREMIA: ICD-10-CM

## 2022-11-01 PROCEDURE — 3075F SYST BP GE 130 - 139MM HG: CPT | Performed by: INTERNAL MEDICINE

## 2022-11-01 PROCEDURE — 3079F DIAST BP 80-89 MM HG: CPT | Performed by: INTERNAL MEDICINE

## 2022-11-01 PROCEDURE — 90686 IIV4 VACC NO PRSV 0.5 ML IM: CPT | Performed by: INTERNAL MEDICINE

## 2022-11-01 PROCEDURE — 90471 IMMUNIZATION ADMIN: CPT | Performed by: INTERNAL MEDICINE

## 2022-11-01 PROCEDURE — 3008F BODY MASS INDEX DOCD: CPT | Performed by: INTERNAL MEDICINE

## 2022-11-01 PROCEDURE — 99213 OFFICE O/P EST LOW 20 MIN: CPT | Performed by: INTERNAL MEDICINE

## 2022-11-01 RX ORDER — METOPROLOL SUCCINATE 200 MG/1
200 TABLET, EXTENDED RELEASE ORAL DAILY
Qty: 90 TABLET | Refills: 1 | Status: SHIPPED | OUTPATIENT
Start: 2022-11-01

## 2022-11-01 RX ORDER — METOPROLOL SUCCINATE 200 MG/1
200 TABLET, EXTENDED RELEASE ORAL DAILY
Qty: 90 TABLET | Refills: 1 | Status: SHIPPED | OUTPATIENT
Start: 2022-11-01 | End: 2022-11-01

## 2022-11-01 RX ORDER — METFORMIN HYDROCHLORIDE 750 MG/1
750 TABLET, EXTENDED RELEASE ORAL DAILY
Qty: 90 TABLET | Refills: 1 | Status: SHIPPED | OUTPATIENT
Start: 2022-11-01

## 2022-11-01 RX ORDER — METFORMIN HYDROCHLORIDE 750 MG/1
750 TABLET, EXTENDED RELEASE ORAL DAILY
Qty: 90 TABLET | Refills: 1 | Status: SHIPPED | OUTPATIENT
Start: 2022-11-01 | End: 2022-11-01

## 2022-11-01 RX ORDER — AMLODIPINE BESYLATE AND BENAZEPRIL HYDROCHLORIDE 5; 20 MG/1; MG/1
1 CAPSULE ORAL DAILY
Qty: 90 CAPSULE | Refills: 1 | Status: SHIPPED | OUTPATIENT
Start: 2022-11-01

## 2022-11-01 RX ORDER — AMLODIPINE BESYLATE AND BENAZEPRIL HYDROCHLORIDE 5; 20 MG/1; MG/1
1 CAPSULE ORAL DAILY
Qty: 90 CAPSULE | Refills: 1 | Status: SHIPPED | OUTPATIENT
Start: 2022-11-01 | End: 2022-11-01

## 2022-11-01 RX ORDER — ATORVASTATIN CALCIUM 10 MG/1
10 TABLET, FILM COATED ORAL DAILY
Qty: 90 TABLET | Refills: 1 | Status: SHIPPED | OUTPATIENT
Start: 2022-11-01

## 2022-11-01 RX ORDER — ATORVASTATIN CALCIUM 10 MG/1
10 TABLET, FILM COATED ORAL DAILY
Qty: 90 TABLET | Refills: 1 | Status: SHIPPED | OUTPATIENT
Start: 2022-11-01 | End: 2022-11-01

## 2022-11-10 DIAGNOSIS — I10 ESSENTIAL HYPERTENSION: ICD-10-CM

## 2022-11-11 DIAGNOSIS — E66.09 CLASS 2 OBESITY DUE TO EXCESS CALORIES WITHOUT SERIOUS COMORBIDITY WITH BODY MASS INDEX (BMI) OF 37.0 TO 37.9 IN ADULT: ICD-10-CM

## 2022-11-11 RX ORDER — AMLODIPINE BESYLATE AND BENAZEPRIL HYDROCHLORIDE 5; 20 MG/1; MG/1
CAPSULE ORAL
Qty: 90 CAPSULE | Refills: 1 | Status: SHIPPED | OUTPATIENT
Start: 2022-11-11

## 2022-11-11 NOTE — TELEPHONE ENCOUNTER
LOV: 11/1/2022 with Dr. Anuj Webb  RTC: 6 months  Last Relevant Labs: 3/16/2022  Filled: 11/1/2022  #90 with 1 refill (this was not electronically sent to the pharmacy; script was printed at last office visit 11/1/2022)    No future appointments.

## 2022-11-12 RX ORDER — METFORMIN HYDROCHLORIDE 750 MG/1
TABLET, EXTENDED RELEASE ORAL
Qty: 30 TABLET | Refills: 0 | OUTPATIENT
Start: 2022-11-12

## 2022-12-05 ENCOUNTER — LAB ENCOUNTER (OUTPATIENT)
Dept: LAB | Age: 47
End: 2022-12-05
Attending: INTERNAL MEDICINE
Payer: COMMERCIAL

## 2022-12-05 DIAGNOSIS — E66.09 CLASS 2 OBESITY DUE TO EXCESS CALORIES WITHOUT SERIOUS COMORBIDITY WITH BODY MASS INDEX (BMI) OF 37.0 TO 37.9 IN ADULT: ICD-10-CM

## 2022-12-05 DIAGNOSIS — I10 ESSENTIAL HYPERTENSION: ICD-10-CM

## 2022-12-05 DIAGNOSIS — E78.00 HYPERCHOLESTEREMIA: ICD-10-CM

## 2022-12-05 LAB
ALBUMIN SERPL-MCNC: 3.8 G/DL (ref 3.4–5)
ALBUMIN/GLOB SERPL: 1 {RATIO} (ref 1–2)
ALP LIVER SERPL-CCNC: 79 U/L
ALT SERPL-CCNC: 43 U/L
ANION GAP SERPL CALC-SCNC: 4 MMOL/L (ref 0–18)
AST SERPL-CCNC: 25 U/L (ref 15–37)
BILIRUB SERPL-MCNC: 0.3 MG/DL (ref 0.1–2)
BUN BLD-MCNC: 16 MG/DL (ref 7–18)
CALCIUM BLD-MCNC: 9.3 MG/DL (ref 8.5–10.1)
CHLORIDE SERPL-SCNC: 105 MMOL/L (ref 98–112)
CHOLEST SERPL-MCNC: 145 MG/DL (ref ?–200)
CO2 SERPL-SCNC: 28 MMOL/L (ref 21–32)
CREAT BLD-MCNC: 1.04 MG/DL
EST. AVERAGE GLUCOSE BLD GHB EST-MCNC: 131 MG/DL (ref 68–126)
FASTING PATIENT LIPID ANSWER: YES
FASTING STATUS PATIENT QL REPORTED: YES
GFR SERPLBLD BASED ON 1.73 SQ M-ARVRAT: 90 ML/MIN/1.73M2 (ref 60–?)
GLOBULIN PLAS-MCNC: 4 G/DL (ref 2.8–4.4)
GLUCOSE BLD-MCNC: 143 MG/DL (ref 70–99)
HBA1C MFR BLD: 6.2 % (ref ?–5.7)
HDLC SERPL-MCNC: 40 MG/DL (ref 40–59)
LDLC SERPL CALC-MCNC: 81 MG/DL (ref ?–100)
NONHDLC SERPL-MCNC: 105 MG/DL (ref ?–130)
OSMOLALITY SERPL CALC.SUM OF ELEC: 288 MOSM/KG (ref 275–295)
POTASSIUM SERPL-SCNC: 4.6 MMOL/L (ref 3.5–5.1)
PROT SERPL-MCNC: 7.8 G/DL (ref 6.4–8.2)
SODIUM SERPL-SCNC: 137 MMOL/L (ref 136–145)
TRIGL SERPL-MCNC: 137 MG/DL (ref 30–149)
VLDLC SERPL CALC-MCNC: 22 MG/DL (ref 0–30)

## 2022-12-05 PROCEDURE — 80061 LIPID PANEL: CPT | Performed by: INTERNAL MEDICINE

## 2022-12-05 PROCEDURE — 80053 COMPREHEN METABOLIC PANEL: CPT | Performed by: INTERNAL MEDICINE

## 2022-12-05 PROCEDURE — 83036 HEMOGLOBIN GLYCOSYLATED A1C: CPT | Performed by: INTERNAL MEDICINE

## 2022-12-05 RX ORDER — METFORMIN HYDROCHLORIDE 750 MG/1
750 TABLET, EXTENDED RELEASE ORAL DAILY
Qty: 90 TABLET | Refills: 1 | Status: SHIPPED | OUTPATIENT
Start: 2022-12-05

## 2023-01-09 DIAGNOSIS — E78.00 HYPERCHOLESTEREMIA: ICD-10-CM

## 2023-01-09 RX ORDER — ATORVASTATIN CALCIUM 10 MG/1
TABLET, FILM COATED ORAL
Qty: 90 TABLET | Refills: 1 | Status: SHIPPED | OUTPATIENT
Start: 2023-01-09

## 2023-03-12 DIAGNOSIS — E66.09 CLASS 2 OBESITY DUE TO EXCESS CALORIES WITHOUT SERIOUS COMORBIDITY WITH BODY MASS INDEX (BMI) OF 37.0 TO 37.9 IN ADULT: ICD-10-CM

## 2023-03-13 RX ORDER — METFORMIN HYDROCHLORIDE 750 MG/1
750 TABLET, EXTENDED RELEASE ORAL DAILY
Qty: 90 TABLET | Refills: 1 | Status: SHIPPED | OUTPATIENT
Start: 2023-03-13

## 2023-03-13 NOTE — TELEPHONE ENCOUNTER
Protocol failed     Requesting: metformin 750mg     LOV: 11/1/22   RTC: 6 months   Filled:12/5/22 # 90 1 refill   Recent Labs: 12/5/22      Upcoming OV: none scheduled

## 2023-05-09 DIAGNOSIS — I10 ESSENTIAL HYPERTENSION: ICD-10-CM

## 2023-05-10 RX ORDER — METOPROLOL SUCCINATE 200 MG/1
TABLET, EXTENDED RELEASE ORAL
Qty: 30 TABLET | Refills: 0 | Status: SHIPPED | OUTPATIENT
Start: 2023-05-10

## 2023-05-10 RX ORDER — AMLODIPINE BESYLATE AND BENAZEPRIL HYDROCHLORIDE 5; 20 MG/1; MG/1
CAPSULE ORAL
Qty: 30 CAPSULE | Refills: 0 | Status: SHIPPED | OUTPATIENT
Start: 2023-05-10

## 2023-05-10 NOTE — TELEPHONE ENCOUNTER
LVM for patient   Due for cpx     Protocol failed     Requesting:   Metoprolol succinate ER 200mg filled 11/1/22 # 90 1 refill   Amlodipine-benazepril 5-20mg filled 11/11/22 # 90 1 refill     LOV:11/1/22   RTC: 6 months   Recent Labs: 12/5/22     Upcoming OV: nones scheduled

## 2023-06-03 DIAGNOSIS — I10 ESSENTIAL HYPERTENSION: ICD-10-CM

## 2023-06-03 RX ORDER — METOPROLOL SUCCINATE 200 MG/1
TABLET, EXTENDED RELEASE ORAL
Qty: 30 TABLET | Refills: 0 | Status: SHIPPED | OUTPATIENT
Start: 2023-06-03

## 2023-06-06 DIAGNOSIS — I10 ESSENTIAL HYPERTENSION: ICD-10-CM

## 2023-06-07 NOTE — TELEPHONE ENCOUNTER
Protocol passed   Requesting: amlodipine-benazepril 5-20mg     LOV: 11/1/22   RTC: 6 months   Filled: 5/10/23 # 30 0 refill   Recent Labs: 12/5/22    Upcoming OV: 6/8/23

## 2023-06-08 ENCOUNTER — OFFICE VISIT (OUTPATIENT)
Dept: INTERNAL MEDICINE CLINIC | Facility: CLINIC | Age: 48
End: 2023-06-08
Payer: COMMERCIAL

## 2023-06-08 VITALS
BODY MASS INDEX: 37.83 KG/M2 | DIASTOLIC BLOOD PRESSURE: 88 MMHG | SYSTOLIC BLOOD PRESSURE: 126 MMHG | RESPIRATION RATE: 18 BRPM | TEMPERATURE: 97 F | WEIGHT: 310.63 LBS | HEART RATE: 82 BPM | HEIGHT: 76 IN | OXYGEN SATURATION: 98 %

## 2023-06-08 DIAGNOSIS — I10 ESSENTIAL HYPERTENSION: Chronic | ICD-10-CM

## 2023-06-08 DIAGNOSIS — Z00.00 ROUTINE GENERAL MEDICAL EXAMINATION AT A HEALTH CARE FACILITY: Primary | ICD-10-CM

## 2023-06-08 DIAGNOSIS — E78.00 HYPERCHOLESTEREMIA: Chronic | ICD-10-CM

## 2023-06-08 DIAGNOSIS — R73.03 PREDIABETES: ICD-10-CM

## 2023-06-08 PROCEDURE — 3079F DIAST BP 80-89 MM HG: CPT | Performed by: INTERNAL MEDICINE

## 2023-06-08 PROCEDURE — 90715 TDAP VACCINE 7 YRS/> IM: CPT | Performed by: INTERNAL MEDICINE

## 2023-06-08 PROCEDURE — 90471 IMMUNIZATION ADMIN: CPT | Performed by: INTERNAL MEDICINE

## 2023-06-08 PROCEDURE — 3074F SYST BP LT 130 MM HG: CPT | Performed by: INTERNAL MEDICINE

## 2023-06-08 PROCEDURE — 3008F BODY MASS INDEX DOCD: CPT | Performed by: INTERNAL MEDICINE

## 2023-06-08 PROCEDURE — 99396 PREV VISIT EST AGE 40-64: CPT | Performed by: INTERNAL MEDICINE

## 2023-06-08 RX ORDER — AMLODIPINE BESYLATE AND BENAZEPRIL HYDROCHLORIDE 5; 20 MG/1; MG/1
CAPSULE ORAL
Qty: 30 CAPSULE | Refills: 0 | Status: SHIPPED | OUTPATIENT
Start: 2023-06-08

## 2023-06-09 ENCOUNTER — LAB ENCOUNTER (OUTPATIENT)
Dept: LAB | Age: 48
End: 2023-06-09
Attending: INTERNAL MEDICINE
Payer: COMMERCIAL

## 2023-06-09 DIAGNOSIS — E78.00 HYPERCHOLESTEREMIA: Chronic | ICD-10-CM

## 2023-06-09 DIAGNOSIS — Z00.00 ROUTINE GENERAL MEDICAL EXAMINATION AT A HEALTH CARE FACILITY: ICD-10-CM

## 2023-06-09 LAB
ALBUMIN SERPL-MCNC: 4 G/DL (ref 3.4–5)
ALBUMIN/GLOB SERPL: 1 {RATIO} (ref 1–2)
ALP LIVER SERPL-CCNC: 75 U/L
ALT SERPL-CCNC: 48 U/L
ANION GAP SERPL CALC-SCNC: 3 MMOL/L (ref 0–18)
AST SERPL-CCNC: 26 U/L (ref 15–37)
BASOPHILS # BLD AUTO: 0.03 X10(3) UL (ref 0–0.2)
BASOPHILS NFR BLD AUTO: 0.4 %
BILIRUB SERPL-MCNC: 0.4 MG/DL (ref 0.1–2)
BILIRUB UR QL STRIP.AUTO: NEGATIVE
BUN BLD-MCNC: 15 MG/DL (ref 7–18)
CALCIUM BLD-MCNC: 9 MG/DL (ref 8.5–10.1)
CHLORIDE SERPL-SCNC: 107 MMOL/L (ref 98–112)
CHOLEST SERPL-MCNC: 108 MG/DL (ref ?–200)
CO2 SERPL-SCNC: 23 MMOL/L (ref 21–32)
COLOR UR AUTO: YELLOW
CREAT BLD-MCNC: 1.01 MG/DL
EOSINOPHIL # BLD AUTO: 0.13 X10(3) UL (ref 0–0.7)
EOSINOPHIL NFR BLD AUTO: 1.9 %
ERYTHROCYTE [DISTWIDTH] IN BLOOD BY AUTOMATED COUNT: 12 %
EST. AVERAGE GLUCOSE BLD GHB EST-MCNC: 134 MG/DL (ref 68–126)
FASTING PATIENT LIPID ANSWER: YES
FASTING STATUS PATIENT QL REPORTED: YES
GFR SERPLBLD BASED ON 1.73 SQ M-ARVRAT: 92 ML/MIN/1.73M2 (ref 60–?)
GLOBULIN PLAS-MCNC: 4 G/DL (ref 2.8–4.4)
GLUCOSE BLD-MCNC: 121 MG/DL (ref 70–99)
GLUCOSE UR STRIP.AUTO-MCNC: NEGATIVE MG/DL
HBA1C MFR BLD: 6.3 % (ref ?–5.7)
HCT VFR BLD AUTO: 45.8 %
HDLC SERPL-MCNC: 27 MG/DL (ref 40–59)
HGB BLD-MCNC: 15.4 G/DL
IMM GRANULOCYTES # BLD AUTO: 0.01 X10(3) UL (ref 0–1)
IMM GRANULOCYTES NFR BLD: 0.1 %
KETONES UR STRIP.AUTO-MCNC: NEGATIVE MG/DL
LDLC SERPL CALC-MCNC: 60 MG/DL (ref ?–100)
LYMPHOCYTES # BLD AUTO: 1.68 X10(3) UL (ref 1–4)
LYMPHOCYTES NFR BLD AUTO: 25 %
MCH RBC QN AUTO: 31.8 PG (ref 26–34)
MCHC RBC AUTO-ENTMCNC: 33.6 G/DL (ref 31–37)
MCV RBC AUTO: 94.4 FL
MONOCYTES # BLD AUTO: 0.6 X10(3) UL (ref 0.1–1)
MONOCYTES NFR BLD AUTO: 8.9 %
NEUTROPHILS # BLD AUTO: 4.27 X10 (3) UL (ref 1.5–7.7)
NEUTROPHILS # BLD AUTO: 4.27 X10(3) UL (ref 1.5–7.7)
NEUTROPHILS NFR BLD AUTO: 63.7 %
NITRITE UR QL STRIP.AUTO: NEGATIVE
NONHDLC SERPL-MCNC: 81 MG/DL (ref ?–130)
OSMOLALITY SERPL CALC.SUM OF ELEC: 278 MOSM/KG (ref 275–295)
PH UR STRIP.AUTO: 5 [PH] (ref 5–8)
PLATELET # BLD AUTO: 194 10(3)UL (ref 150–450)
POTASSIUM SERPL-SCNC: 4 MMOL/L (ref 3.5–5.1)
PROT SERPL-MCNC: 8 G/DL (ref 6.4–8.2)
PROT UR STRIP.AUTO-MCNC: NEGATIVE MG/DL
RBC # BLD AUTO: 4.85 X10(6)UL
RBC UR QL AUTO: NEGATIVE
SODIUM SERPL-SCNC: 133 MMOL/L (ref 136–145)
SP GR UR STRIP.AUTO: 1.02 (ref 1–1.03)
TRIGL SERPL-MCNC: 114 MG/DL (ref 30–149)
TSI SER-ACNC: 1.93 MIU/ML (ref 0.36–3.74)
UROBILINOGEN UR STRIP.AUTO-MCNC: <2 MG/DL
VLDLC SERPL CALC-MCNC: 17 MG/DL (ref 0–30)
WBC # BLD AUTO: 6.7 X10(3) UL (ref 4–11)

## 2023-06-09 PROCEDURE — 80050 GENERAL HEALTH PANEL: CPT | Performed by: INTERNAL MEDICINE

## 2023-06-09 PROCEDURE — 80061 LIPID PANEL: CPT | Performed by: INTERNAL MEDICINE

## 2023-06-09 PROCEDURE — 83036 HEMOGLOBIN GLYCOSYLATED A1C: CPT | Performed by: INTERNAL MEDICINE

## 2023-06-09 PROCEDURE — 81001 URINALYSIS AUTO W/SCOPE: CPT | Performed by: INTERNAL MEDICINE

## 2023-06-12 ENCOUNTER — TELEPHONE (OUTPATIENT)
Dept: INTERNAL MEDICINE CLINIC | Facility: CLINIC | Age: 48
End: 2023-06-12

## 2023-06-12 DIAGNOSIS — R82.90 ABNORMAL URINE: Primary | ICD-10-CM

## 2023-06-12 RX ORDER — SULFAMETHOXAZOLE AND TRIMETHOPRIM 800; 160 MG/1; MG/1
1 TABLET ORAL 2 TIMES DAILY
Qty: 28 TABLET | Refills: 0 | Status: SHIPPED | OUTPATIENT
Start: 2023-06-12

## 2023-06-12 NOTE — TELEPHONE ENCOUNTER
Robert Kirk MD   6/12/2023 10:12 AM CDT Back to Top      Reviewed results   Globin A1c stable at 6.3 however the urine does show WBC would recommend taking Bactrim double strength 1 twice daily for 14 days and repeat urine after that rest of the lab work is within acceptable limits

## 2023-06-12 NOTE — TELEPHONE ENCOUNTER
Spoke to patient and relayed results and recommendations from provider. Pended orders for repeat urine testing (not sure if you wanted a culture when he repeated UA also) - please review and sign orders if correct.  Ty.

## 2023-06-29 DIAGNOSIS — I10 ESSENTIAL HYPERTENSION: ICD-10-CM

## 2023-07-02 DIAGNOSIS — I10 ESSENTIAL HYPERTENSION: ICD-10-CM

## 2023-07-03 DIAGNOSIS — E78.00 HYPERCHOLESTEREMIA: ICD-10-CM

## 2023-07-03 RX ORDER — ATORVASTATIN CALCIUM 10 MG/1
10 TABLET, FILM COATED ORAL DAILY
Qty: 90 TABLET | Refills: 1 | Status: SHIPPED | OUTPATIENT
Start: 2023-07-03

## 2023-07-03 RX ORDER — AMLODIPINE BESYLATE AND BENAZEPRIL HYDROCHLORIDE 5; 20 MG/1; MG/1
CAPSULE ORAL
Qty: 90 CAPSULE | Refills: 0 | Status: SHIPPED | OUTPATIENT
Start: 2023-07-03

## 2023-07-03 RX ORDER — METOPROLOL SUCCINATE 200 MG/1
TABLET, EXTENDED RELEASE ORAL
Qty: 30 TABLET | Refills: 0 | Status: SHIPPED | OUTPATIENT
Start: 2023-07-03

## 2023-07-13 ENCOUNTER — LAB ENCOUNTER (OUTPATIENT)
Dept: LAB | Age: 48
End: 2023-07-13
Attending: INTERNAL MEDICINE
Payer: COMMERCIAL

## 2023-07-13 DIAGNOSIS — R82.90 ABNORMAL URINE: ICD-10-CM

## 2023-07-13 LAB
BILIRUB UR QL STRIP.AUTO: NEGATIVE
CLARITY UR REFRACT.AUTO: CLEAR
COLOR UR AUTO: YELLOW
GLUCOSE UR STRIP.AUTO-MCNC: NEGATIVE MG/DL
KETONES UR STRIP.AUTO-MCNC: NEGATIVE MG/DL
NITRITE UR QL STRIP.AUTO: NEGATIVE
PH UR STRIP.AUTO: 6 [PH] (ref 5–8)
PROT UR STRIP.AUTO-MCNC: NEGATIVE MG/DL
RBC UR QL AUTO: NEGATIVE
SP GR UR STRIP.AUTO: 1.02 (ref 1–1.03)
UROBILINOGEN UR STRIP.AUTO-MCNC: 2 MG/DL

## 2023-07-13 PROCEDURE — 81001 URINALYSIS AUTO W/SCOPE: CPT | Performed by: INTERNAL MEDICINE

## 2023-07-13 PROCEDURE — 87086 URINE CULTURE/COLONY COUNT: CPT | Performed by: INTERNAL MEDICINE

## 2023-07-25 DIAGNOSIS — I10 ESSENTIAL HYPERTENSION: ICD-10-CM

## 2023-07-25 RX ORDER — METOPROLOL SUCCINATE 200 MG/1
TABLET, EXTENDED RELEASE ORAL
Qty: 90 TABLET | Refills: 0 | Status: SHIPPED | OUTPATIENT
Start: 2023-07-25

## 2023-07-25 NOTE — TELEPHONE ENCOUNTER
LOV: 6/8/23   RTC: 6 months   Filled 7/25/23 # 30 0 refills   Labs: 6/5/23   No future appointments.

## 2023-09-08 DIAGNOSIS — E66.09 CLASS 2 OBESITY DUE TO EXCESS CALORIES WITHOUT SERIOUS COMORBIDITY WITH BODY MASS INDEX (BMI) OF 37.0 TO 37.9 IN ADULT: ICD-10-CM

## 2023-09-08 NOTE — TELEPHONE ENCOUNTER
metFORMIN  MG Oral Tablet 24 Hr          Sig: Take 1 tablet (750 mg total) by mouth daily. Disp: 90 tablet    Refills: 1    Start: 9/8/2023    Class: Normal    Non-formulary For: Class 2 obesity due to excess calories without serious comorbidity with body mass index (BMI) of 37.0 to 37.9 in adult    Last ordered: 5 months ago by Kathy Dunne MD     Diabetic Medication Protocol [id number]/08/2023 11:41 AM   Protocol Details Microalbumin procedure in past 12 months or taking ACE/ARB    HgBA1C procedure resulted in past 6 months    Last HgBA1C < 7.5    Appointment in past 6 or next 3 months      LOV 6/8/23  RTC 6 months  Filled 3/13/23 90 tabs 1 refill  Last labs 6/9/23  No future appointments.

## 2023-09-10 RX ORDER — METFORMIN HYDROCHLORIDE 750 MG/1
750 TABLET, EXTENDED RELEASE ORAL DAILY
Qty: 90 TABLET | Refills: 1 | Status: SHIPPED | OUTPATIENT
Start: 2023-09-10

## 2023-09-27 DIAGNOSIS — I10 ESSENTIAL HYPERTENSION: ICD-10-CM

## 2023-09-28 RX ORDER — AMLODIPINE BESYLATE AND BENAZEPRIL HYDROCHLORIDE 5; 20 MG/1; MG/1
CAPSULE ORAL
Qty: 90 CAPSULE | Refills: 0 | Status: SHIPPED | OUTPATIENT
Start: 2023-09-28

## 2023-09-28 NOTE — TELEPHONE ENCOUNTER
AMLODIPINE-BENAZEPRIL 5-20 MG          Will file in chart as: AMLODIPINE BESY-BENAZEPRIL HCL 5-20 MG Oral Cap    Sig: TAKE 1 CAPSULE BY MOUTH EVERY DAY    Disp: 90 capsule    Refills: 0 (Pharmacy requested: Not specified)    Start: 9/27/2023    Class: Normal    Non-formulary For: Essential hypertension    Last ordered: 2 months ago (7/3/2023) by Rajan Brush MD    Last refill: 7/3/2023    Rx #: [id number]    Hypertension Medications Protocol Oxoygc1009/27/2023 01:06 AM   Protocol Details CMP or BMP in past 12 months    Last serum creatinine< 2.0    Appointment in past 6 or next 3 months      LOV 6/8/23  RTC 6 months  Filled 7/3/23 90 caps 0 refills  Last labs 6/9/23  No future appointments.

## 2023-10-26 DIAGNOSIS — I10 ESSENTIAL HYPERTENSION: ICD-10-CM

## 2023-10-26 RX ORDER — METOPROLOL SUCCINATE 200 MG/1
200 TABLET, EXTENDED RELEASE ORAL DAILY
Qty: 90 TABLET | Refills: 0 | Status: SHIPPED | OUTPATIENT
Start: 2023-10-26

## 2023-10-26 NOTE — TELEPHONE ENCOUNTER
LOV: 6/8/2023 with Dr. Chay Gómez  RTC: 6 months  Last Relevant Labs: 6/9/2023  Filled: 7/25/2023   #90 with 0 refills    No future appointments.

## 2023-12-24 DIAGNOSIS — I10 ESSENTIAL HYPERTENSION: ICD-10-CM

## 2023-12-24 DIAGNOSIS — E78.00 HYPERCHOLESTEREMIA: ICD-10-CM

## 2023-12-26 RX ORDER — AMLODIPINE BESYLATE AND BENAZEPRIL HYDROCHLORIDE 5; 20 MG/1; MG/1
1 CAPSULE ORAL DAILY
Qty: 90 CAPSULE | Refills: 0 | Status: SHIPPED | OUTPATIENT
Start: 2023-12-26

## 2023-12-26 RX ORDER — ATORVASTATIN CALCIUM 10 MG/1
10 TABLET, FILM COATED ORAL DAILY
Qty: 90 TABLET | Refills: 1 | Status: SHIPPED | OUTPATIENT
Start: 2023-12-26

## 2023-12-26 NOTE — TELEPHONE ENCOUNTER
Protocol FAIL  Requesting:   AMLODIPINE BESY-BENAZEPRIL HCL 5-20 MG Oral Cap 9/28/23 90 capsule     Protocol: PASS  Requesting:  atorvastatin 10 MG Oral Tab 7/3/23 90 tablet 1 refill    LOV: 6/8/23  RTC: 6 months  Recent Labs: 6/9/23    Upcoming OV   No future appointments.

## 2024-03-22 DIAGNOSIS — I10 ESSENTIAL HYPERTENSION: ICD-10-CM

## 2024-03-25 RX ORDER — AMLODIPINE BESYLATE AND BENAZEPRIL HYDROCHLORIDE 5; 20 MG/1; MG/1
1 CAPSULE ORAL DAILY
Qty: 90 CAPSULE | Refills: 0 | Status: SHIPPED | OUTPATIENT
Start: 2024-03-25

## 2024-03-25 NOTE — TELEPHONE ENCOUNTER
Protocol passed     Amlodipine-benazepril 5-20mg     LOV: 6/8/23   RTC: 6 months   Filled: 12/26/23 #90   Labs: 6/9/23   Future Appointments   Date Time Provider Department Center   6/20/2024  4:00 PM Alex Crawford MD [facility] [place]

## 2024-04-21 DIAGNOSIS — I10 ESSENTIAL HYPERTENSION: ICD-10-CM

## 2024-04-22 RX ORDER — METOPROLOL SUCCINATE 200 MG/1
200 TABLET, EXTENDED RELEASE ORAL DAILY
Qty: 90 TABLET | Refills: 0 | Status: SHIPPED | OUTPATIENT
Start: 2024-04-22

## 2024-04-22 NOTE — TELEPHONE ENCOUNTER
Name from pharmacy: METOPROLOL SUCC  MG TAB         Will file in chart as: METOPROLOL SUCCINATE  MG Oral Tablet 24 Hr    Sig: TAKE 1 TABLET BY MOUTH EVERY DAY    Disp: 90 tablet    Refills: 0 (Pharmacy requested: Not specified)    Start: 4/21/2024    Class: Normal    Non-formulary For: Essential hypertension    Last ordered: 2 months ago (1/25/2024) by Klarissa Tinoco MD    Last refill: 1/25/2024    Rx #: [id number]    Hypertension Medications Protocol Zizmxd4104/21/2024 07:22 AM   Protocol Details CMP or BMP in past 12 months    Last BP reading less than 140/90    In person appointment or virtual visit in the past 12 mos or appointment in next 3 mos    EGFRCR or GFRNAA > 50      To be filled at: [facility]/pharmacy #[id number] - [place], [place] - [address] AT [facility], [phone], [phone]

## 2024-05-30 DIAGNOSIS — E66.09 CLASS 2 OBESITY DUE TO EXCESS CALORIES WITHOUT SERIOUS COMORBIDITY WITH BODY MASS INDEX (BMI) OF 37.0 TO 37.9 IN ADULT: ICD-10-CM

## 2024-05-30 RX ORDER — METFORMIN HYDROCHLORIDE 750 MG/1
750 TABLET, EXTENDED RELEASE ORAL DAILY
Qty: 90 TABLET | Refills: 1 | Status: SHIPPED | OUTPATIENT
Start: 2024-05-30

## 2024-06-20 ENCOUNTER — OFFICE VISIT (OUTPATIENT)
Dept: INTERNAL MEDICINE CLINIC | Facility: CLINIC | Age: 49
End: 2024-06-20

## 2024-06-20 VITALS
TEMPERATURE: 98 F | SYSTOLIC BLOOD PRESSURE: 130 MMHG | RESPIRATION RATE: 16 BRPM | DIASTOLIC BLOOD PRESSURE: 90 MMHG | BODY MASS INDEX: 38.36 KG/M2 | HEIGHT: 76 IN | WEIGHT: 315 LBS | HEART RATE: 77 BPM | OXYGEN SATURATION: 96 %

## 2024-06-20 DIAGNOSIS — Z00.00 ROUTINE GENERAL MEDICAL EXAMINATION AT A HEALTH CARE FACILITY: Primary | ICD-10-CM

## 2024-06-20 DIAGNOSIS — I10 ESSENTIAL HYPERTENSION: Chronic | ICD-10-CM

## 2024-06-20 DIAGNOSIS — I10 ESSENTIAL HYPERTENSION: ICD-10-CM

## 2024-06-20 DIAGNOSIS — E78.00 HYPERCHOLESTEREMIA: Chronic | ICD-10-CM

## 2024-06-20 DIAGNOSIS — E78.00 HYPERCHOLESTEREMIA: ICD-10-CM

## 2024-06-20 PROCEDURE — 3075F SYST BP GE 130 - 139MM HG: CPT | Performed by: INTERNAL MEDICINE

## 2024-06-20 PROCEDURE — 3008F BODY MASS INDEX DOCD: CPT | Performed by: INTERNAL MEDICINE

## 2024-06-20 PROCEDURE — 99396 PREV VISIT EST AGE 40-64: CPT | Performed by: INTERNAL MEDICINE

## 2024-06-20 PROCEDURE — 3080F DIAST BP >= 90 MM HG: CPT | Performed by: INTERNAL MEDICINE

## 2024-06-20 NOTE — PROGRESS NOTES
Scott Wegener DOB 1975 is a 48 year old male.  Chief Complaint   Patient presents with    Physical       HPI:    see below   Current Outpatient Medications   Medication Sig Dispense Refill    METFORMIN  MG Oral Tablet 24 Hr TAKE 1 TABLET BY MOUTH EVERY DAY 90 tablet 1    Metoprolol Succinate  MG Oral Tablet 24 Hr Take 1 tablet (200 mg total) by mouth daily. 90 tablet 0    AMLODIPINE BESY-BENAZEPRIL HCL 5-20 MG Oral Cap TAKE 1 CAPSULE BY MOUTH EVERY DAY 90 capsule 0    atorvastatin 10 MG Oral Tab Take 1 tablet (10 mg total) by mouth daily. 90 tablet 1      Past Medical History:    Essential hypertension      Social History:  Social History     Socioeconomic History    Marital status: Single   Tobacco Use    Smoking status: Never    Smokeless tobacco: Never   Vaping Use    Vaping status: Never Used   Substance and Sexual Activity    Alcohol use: No    Drug use: No        REVIEW OF SYSTEMS:     General/Constitutional:   General able to do usual activities, good exercise tolerance, good general state of health, no fatigue, no fever, no weakness .   HEENT/Neck:   Head no dizziness, no lightheadedness. Eyes normal vision, no redness , no drainage. Ears no earaches, no fullness, normal hearing, no tinnitus. Nose and Sinuses no recurrent colds, no stuffiness, no discharge, no hay fever, no nosebleeds, no sinus trouble. Mouth and Pharynx no sore throats, no hoarseness. Neck no lumps, no goiter, no neck stiffness or pain.   Endocrine:   Diabetes prediabetic . Thyroid disorder none.   Respiratory:   Patient denies chest pain, cough, THRASHER (dyspnea on exertion),wheezing. Breathing normal pattern . Chest congestion none.   Cardiovascular:   Patient denies chest pain, rheumatic fever,heart murmur.no hx of elevated cholesterol/. Leg edema none. Orthopnea no. Palpitations none. PND (paroxsymal nocturnal dyspnea) none.  History of htn- no exercise does lot of heavy work   Gastrointestinal:   Patient denies abdominal  pain, acid reflux, blood in stool, vomiting, nausea, heartburn no change in appetite noted no change in bowel movement noted. Dysphagia none.   Hematology:   Patient denies abnormal bleeding, easy bruising. Enlarged lymph nodes none.   Men Only:   Patient denies difficulty with erection, penile discharge, testicular pain   Genitourinary:   Patient denies blood in urine, burning on urination, difficulty urinating, urinary frequency , urinary incontinence/no history of kidney disease or genital abnormalities. Dysuria none. Nocturia None.   Musculoskeletal:   Patient denies arthritis , back pain, muscle weakness . Joint pain none. Joint stiffness none.   Peripheral Vascular:   General no varicosities, no claudication.   Dermatologic:   Rash none.   Neurologic:   Patient denies dizziness, fainting, headache, loss of consciousness, memory loss, seizures, paresthesias, weakness. Tingling/numbness none. Trouble with balance none.   Psychiatric:   Patient denies anxiety, depression, hallucinations. Insomnia none/no hx of sleep disorder. Memory loss none.     EXAM:   /90 (BP Location: Left arm, Patient Position: Sitting, Cuff Size: large)   Pulse 77   Temp 97.9 °F (36.6 °C) (Temporal)   Resp 16   Ht 6' 4\" (1.93 m)   Wt (!) 316 lb (143.3 kg)   SpO2 96%   BMI 38.46 kg/m²   GENERAL:   Build: average .   HEENT:   Ear canals: normal.   EOM: within normal limits.Pupils BEERTL.Sclera and Conjunctiva normal.     Head: normocephalic.   Nasal septum: midline.   Nose: normal pink mucosa, no congestion, no swelling, no bleeding.   Oral cavity: normal, no lesions seen.   Turbinates: normal.   NECK:   Carotid bruit: none.   Cervical lymph nodes: unremarkable.   JVD: none.   Range of Motion: normal.   Thyroid: unremarkable.   HEART:   Clicks: no.   Heart sounds: normal.   Murmurs: none.   Rhythm: regular.   LUNGS:   Auscultation: clear .   Chest Shape: normal .   Percussion: normal.   Rales: no .   Respiratory effort: normal .    Rhonchi: no.   Wheezes: no.   ABDOMEN:   Bowel sounds: normal.   General: normal.   Hernia: absent.   Liver, Spleen: no hepatosplenomegaly (HSM).   Tenderness: absent .   GENITOURINARY - MALE:   External genitals: unremarkable.   YONI: normal .   Penis: unremarkable.   vitilgo of the scrotum noted  Testicles: unremarkable.   EXTREMITIES:   Clubbing: none.   Cyanosis: absent .   Edema: Trace  Pulses: present.   Tremors: no.   Varicose veins: absent .   MUSCULOSKELETAL:   Cervical spines: normal.   L-S spines: normal.   Lower extremity joints: normal.   Upper extremity joints: normal.   NEUROLOGICAL:   Cognitive function  Mood /affect -thought :perception :normal  Appearance-orientation normal  Thought normal   Babinski: negative..   Cerebellar Testing grossly/intact: yes..   Cranial Nerves: CN's II-XII grossly intact.   Gait: normal.   Motor: Power,tone,co-ordination normalInvoluntary movements and wasting none.   Reflexes: normal.   Sensory: all sensory modalities normal.   LYMPHATICS:   Cervical: none.   Groin: no adenopathy .   Inguinal: no adenopathy.   Supraclavicular: none.   DERMATOLOGY:   Rash: no.       ASSESSMENT AND PLAN:   Travis was seen today for physical.    Diagnoses and all orders for this visit:    Routine general medical examination at a health care facility  -     CBC With Differential With Platelet; Future  -     Comp Metabolic Panel (14); Future  -     Hemoglobin A1C; Future  -     Lipid Panel; Future  -     Assay, Thyroid Stim Hormone; Future  -     Urinalysis, Routine; Future  -     Gastro Referral - In Network    Essential hypertension    Hypercholesteremia  -     Lipid Panel; Future        Patient Instructions   Recommendation once lab work is available.  GLP-1 agonist was discussed with the patient    The patient indicates understanding of these issues and agrees to the plan.  The patient is asked to Return in about 6 months (around 12/20/2024).      Alex Crawford MD

## 2024-06-21 RX ORDER — AMLODIPINE BESYLATE AND BENAZEPRIL HYDROCHLORIDE 5; 20 MG/1; MG/1
1 CAPSULE ORAL DAILY
Qty: 90 CAPSULE | Refills: 0 | Status: SHIPPED | OUTPATIENT
Start: 2024-06-21

## 2024-06-21 RX ORDER — ATORVASTATIN CALCIUM 10 MG/1
10 TABLET, FILM COATED ORAL DAILY
Qty: 90 TABLET | Refills: 1 | Status: SHIPPED | OUTPATIENT
Start: 2024-06-21

## 2024-06-24 ENCOUNTER — LAB ENCOUNTER (OUTPATIENT)
Dept: LAB | Age: 49
End: 2024-06-24
Attending: INTERNAL MEDICINE

## 2024-06-24 DIAGNOSIS — E78.00 HYPERCHOLESTEREMIA: Chronic | ICD-10-CM

## 2024-06-24 DIAGNOSIS — Z00.00 ROUTINE GENERAL MEDICAL EXAMINATION AT A HEALTH CARE FACILITY: ICD-10-CM

## 2024-06-24 PROBLEM — E11.9 CONTROLLED TYPE 2 DIABETES MELLITUS WITHOUT COMPLICATION, WITHOUT LONG-TERM CURRENT USE OF INSULIN (HCC): Status: ACTIVE | Noted: 2024-06-24

## 2024-06-24 LAB
ALBUMIN SERPL-MCNC: 4.6 G/DL (ref 3.2–4.8)
ALBUMIN/GLOB SERPL: 1.5 {RATIO} (ref 1–2)
ALP LIVER SERPL-CCNC: 75 U/L
ALT SERPL-CCNC: 45 U/L
ANION GAP SERPL CALC-SCNC: 7 MMOL/L (ref 0–18)
AST SERPL-CCNC: 33 U/L (ref ?–34)
BASOPHILS # BLD AUTO: 0.04 X10(3) UL (ref 0–0.2)
BASOPHILS NFR BLD AUTO: 0.6 %
BILIRUB SERPL-MCNC: 0.3 MG/DL (ref 0.3–1.2)
BILIRUB UR QL: NEGATIVE
BUN BLD-MCNC: 16 MG/DL (ref 9–23)
BUN/CREAT SERPL: 14.4 (ref 10–20)
CALCIUM BLD-MCNC: 9.8 MG/DL (ref 8.7–10.4)
CHLORIDE SERPL-SCNC: 104 MMOL/L (ref 98–112)
CHOLEST SERPL-MCNC: 128 MG/DL (ref ?–200)
CLARITY UR: CLEAR
CO2 SERPL-SCNC: 25 MMOL/L (ref 21–32)
CREAT BLD-MCNC: 1.11 MG/DL
DEPRECATED RDW RBC AUTO: 40.6 FL (ref 35.1–46.3)
EGFRCR SERPLBLD CKD-EPI 2021: 82 ML/MIN/1.73M2 (ref 60–?)
EOSINOPHIL # BLD AUTO: 0.15 X10(3) UL (ref 0–0.7)
EOSINOPHIL NFR BLD AUTO: 2.4 %
ERYTHROCYTE [DISTWIDTH] IN BLOOD BY AUTOMATED COUNT: 11.7 % (ref 11–15)
EST. AVERAGE GLUCOSE BLD GHB EST-MCNC: 143 MG/DL (ref 68–126)
FASTING PATIENT LIPID ANSWER: YES
FASTING STATUS PATIENT QL REPORTED: YES
GLOBULIN PLAS-MCNC: 3.1 G/DL (ref 2–3.5)
GLUCOSE BLD-MCNC: 121 MG/DL (ref 70–99)
GLUCOSE UR-MCNC: NORMAL MG/DL
HBA1C MFR BLD: 6.6 % (ref ?–5.7)
HCT VFR BLD AUTO: 45 %
HDLC SERPL-MCNC: 30 MG/DL (ref 40–59)
HGB BLD-MCNC: 15.6 G/DL
HGB UR QL STRIP.AUTO: NEGATIVE
IMM GRANULOCYTES # BLD AUTO: 0.01 X10(3) UL (ref 0–1)
IMM GRANULOCYTES NFR BLD: 0.2 %
KETONES UR-MCNC: NEGATIVE MG/DL
LDLC SERPL CALC-MCNC: 72 MG/DL (ref ?–100)
LEUKOCYTE ESTERASE UR QL STRIP.AUTO: 250
LYMPHOCYTES # BLD AUTO: 1.58 X10(3) UL (ref 1–4)
LYMPHOCYTES NFR BLD AUTO: 25.6 %
MCH RBC QN AUTO: 32.4 PG (ref 26–34)
MCHC RBC AUTO-ENTMCNC: 34.7 G/DL (ref 31–37)
MCV RBC AUTO: 93.6 FL
MONOCYTES # BLD AUTO: 0.51 X10(3) UL (ref 0.1–1)
MONOCYTES NFR BLD AUTO: 8.3 %
NEUTROPHILS # BLD AUTO: 3.89 X10 (3) UL (ref 1.5–7.7)
NEUTROPHILS # BLD AUTO: 3.89 X10(3) UL (ref 1.5–7.7)
NEUTROPHILS NFR BLD AUTO: 62.9 %
NITRITE UR QL STRIP.AUTO: NEGATIVE
NONHDLC SERPL-MCNC: 98 MG/DL (ref ?–130)
OSMOLALITY SERPL CALC.SUM OF ELEC: 284 MOSM/KG (ref 275–295)
PH UR: 5.5 [PH] (ref 5–8)
PLATELET # BLD AUTO: 216 10(3)UL (ref 150–450)
POTASSIUM SERPL-SCNC: 4.8 MMOL/L (ref 3.5–5.1)
PROT SERPL-MCNC: 7.7 G/DL (ref 5.7–8.2)
PROT UR-MCNC: NEGATIVE MG/DL
RBC # BLD AUTO: 4.81 X10(6)UL
SODIUM SERPL-SCNC: 136 MMOL/L (ref 136–145)
SP GR UR STRIP: 1.02 (ref 1–1.03)
TRIGL SERPL-MCNC: 146 MG/DL (ref 30–149)
TSI SER-ACNC: 3.07 MIU/ML (ref 0.55–4.78)
UROBILINOGEN UR STRIP-ACNC: NORMAL
VLDLC SERPL CALC-MCNC: 22 MG/DL (ref 0–30)
WBC # BLD AUTO: 6.2 X10(3) UL (ref 4–11)

## 2024-06-24 PROCEDURE — 81001 URINALYSIS AUTO W/SCOPE: CPT | Performed by: INTERNAL MEDICINE

## 2024-06-24 PROCEDURE — 83036 HEMOGLOBIN GLYCOSYLATED A1C: CPT | Performed by: INTERNAL MEDICINE

## 2024-06-24 PROCEDURE — 80050 GENERAL HEALTH PANEL: CPT | Performed by: INTERNAL MEDICINE

## 2024-06-24 PROCEDURE — 80061 LIPID PANEL: CPT | Performed by: INTERNAL MEDICINE

## 2024-07-18 DIAGNOSIS — I10 ESSENTIAL HYPERTENSION: ICD-10-CM

## 2024-07-20 RX ORDER — METOPROLOL SUCCINATE 200 MG/1
200 TABLET, EXTENDED RELEASE ORAL DAILY
Qty: 90 TABLET | Refills: 1 | Status: SHIPPED | OUTPATIENT
Start: 2024-07-20

## 2024-07-20 NOTE — TELEPHONE ENCOUNTER
Protocol failed     Metoprolol succinate ER 200mg     LOV: 6/20/24   RTC: 6 months   Filled: 4/22/24 # 90 0 refill   Labs: 6/24/24  No future appointments.

## 2024-09-17 DIAGNOSIS — I10 ESSENTIAL HYPERTENSION: ICD-10-CM

## 2024-09-17 RX ORDER — AMLODIPINE AND BENAZEPRIL HYDROCHLORIDE 5; 20 MG/1; MG/1
1 CAPSULE ORAL DAILY
Qty: 90 CAPSULE | Refills: 0 | Status: SHIPPED | OUTPATIENT
Start: 2024-09-17

## 2024-09-17 NOTE — TELEPHONE ENCOUNTER
Name from pharmacy: AMLODIPINE-BENAZEPRIL 5-20 MG         Will file in chart as: AMLODIPINE BESY-BENAZEPRIL HCL 5-20 MG Oral Cap    Sig: TAKE 1 CAPSULE BY MOUTH EVERY DAY    Disp: 90 capsule    Refills: 0 (Pharmacy requested: Not specified)    Start: 9/17/2024    Class: Normal    Non-formulary For: Essential hypertension    Last ordered: 2 months ago (6/21/2024) by Alex Crawford MD    Last refill: 6/21/2024    Rx #: [id number]    Hypertension Medications Protocol Baxupa1309/17/2024 12:20 AM   Protocol Details Last BP reading less than 140/90    CMP or BMP in past 12 months    In person appointment or virtual visit in the past 12 mos or appointment in next 3 mos    EGFRCR or GFRNAA > 50      To be filled at: [facility]/pharmacy #[id number] - [place], [place] - [address] AT [facility], [phone], [phone]

## 2024-11-21 DIAGNOSIS — E66.09 CLASS 2 OBESITY DUE TO EXCESS CALORIES WITHOUT SERIOUS COMORBIDITY WITH BODY MASS INDEX (BMI) OF 37.0 TO 37.9 IN ADULT: ICD-10-CM

## 2024-11-21 DIAGNOSIS — E66.812 CLASS 2 OBESITY DUE TO EXCESS CALORIES WITHOUT SERIOUS COMORBIDITY WITH BODY MASS INDEX (BMI) OF 37.0 TO 37.9 IN ADULT: ICD-10-CM

## 2024-11-22 RX ORDER — METFORMIN HYDROCHLORIDE 750 MG/1
750 TABLET, EXTENDED RELEASE ORAL DAILY
Qty: 90 TABLET | Refills: 1 | Status: SHIPPED | OUTPATIENT
Start: 2024-11-22

## 2024-12-13 DIAGNOSIS — I10 ESSENTIAL HYPERTENSION: ICD-10-CM

## 2024-12-13 DIAGNOSIS — E78.00 HYPERCHOLESTEREMIA: ICD-10-CM

## 2024-12-14 RX ORDER — AMLODIPINE AND BENAZEPRIL HYDROCHLORIDE 5; 20 MG/1; MG/1
1 CAPSULE ORAL DAILY
Qty: 90 CAPSULE | Refills: 0 | Status: SHIPPED | OUTPATIENT
Start: 2024-12-14

## 2024-12-14 RX ORDER — ATORVASTATIN CALCIUM 10 MG/1
10 TABLET, FILM COATED ORAL DAILY
Qty: 90 TABLET | Refills: 1 | Status: SHIPPED | OUTPATIENT
Start: 2024-12-14

## 2024-12-14 NOTE — TELEPHONE ENCOUNTER
Requesting    Name from pharmacy: ATORVASTATIN 10 MG TABLET         Will file in chart as: ATORVASTATIN 10 MG Oral Tab    Sig: TAKE 1 TABLET BY MOUTH EVERY DAY    Disp: 90 tablet    Refills: 1    Start: 12/13/2024    Class: Normal    Non-formulary For: Hypercholesteremia    Last ordered: 5 months ago (6/21/2024) by Alex Crawford MD    Last refill: 9/18/2024    Rx #: [id number]    Cholesterol Medication Protocol Wjmwnc3712/13/2024 12:25 AM   Protocol Details ALT < 80    ALT resulted within past year    Lipid panel within past 12 months    In person appointment or virtual visit in the past 12 mos or appointment in next 3 mos       Name from pharmacy: AMLODIPINE-BENAZEPRIL 5-20 MG         Will file in chart as: AMLODIPINE BESY-BENAZEPRIL HCL 5-20 MG Oral Cap    Sig: TAKE 1 CAPSULE BY MOUTH EVERY DAY    Disp: 90 capsule    Refills: 0 (Pharmacy requested: Not specified)    Start: 12/13/2024    Class: Normal    For: Essential hypertension    Last ordered: 2 months ago (9/17/2024) by Alex Crawford MD    Last refill: 9/17/2024    Rx #: [id number]    Hypertension Medications Protocol Enxlex2012/13/2024 12:25 AM   Protocol Details Last BP reading less than 140/90    CMP or BMP in past 12 months    In person appointment or virtual visit in the past 12 mos or appointment in next 3 mos    EGFRCR or GFRNAA > 50        LOV: 6/20/2024  RTC: 6 months  Last Relevant Labs: 6/24/2024  Filled:   Amlodipine-9/17/2024 #90 with 0 refills  Atorvastatin-6/21/2024 #90 with 1 refills  No future appointments.

## 2025-01-15 DIAGNOSIS — I10 ESSENTIAL HYPERTENSION: ICD-10-CM

## 2025-01-16 RX ORDER — METOPROLOL SUCCINATE 200 MG/1
200 TABLET, EXTENDED RELEASE ORAL DAILY
Qty: 90 TABLET | Refills: 1 | Status: SHIPPED | OUTPATIENT
Start: 2025-01-16

## 2025-01-16 NOTE — TELEPHONE ENCOUNTER
Protocol failed     LOV: 6/20/24   RTC: 6 months  Filled: 7/20/24 #90 1 refill   Labs: 6/24/24   No future appointments.

## 2025-03-15 DIAGNOSIS — I10 ESSENTIAL HYPERTENSION: ICD-10-CM

## 2025-03-17 RX ORDER — AMLODIPINE AND BENAZEPRIL HYDROCHLORIDE 5; 20 MG/1; MG/1
1 CAPSULE ORAL DAILY
Qty: 90 CAPSULE | Refills: 0 | Status: SHIPPED | OUTPATIENT
Start: 2025-03-17

## 2025-03-17 NOTE — TELEPHONE ENCOUNTER
Requesting    Name from pharmacy: AMLODIPINE-BENAZEPRIL 5-20 MG         Will file in chart as: AMLODIPINE BESY-BENAZEPRIL HCL 5-20 MG Oral Cap    Sig: TAKE 1 CAPSULE BY MOUTH EVERY DAY    Disp: 90 capsule    Refills: 0 (Pharmacy requested: Not specified)    Start: 3/15/2025    Class: Normal    Non-formulary For: Essential hypertension    Last ordered: 3 months ago (12/14/2024) by France Mulligan DO    Last refill: 12/14/2024    Rx #: [id number]    Hypertension Medications Protocol Otkzxd88/15/2025 08:34 AM   Protocol Details Last BP reading less than 140/90    CMP or BMP in past 12 months    In person appointment or virtual visit in the past 12 mos or appointment in next 3 mos    EGFRCR or GFRNAA > 50    Medication is active on med list        LOV: 6/20/2024  RTC: 6 months   Last Relevant Labs: 6/24/2024  Filled: 12/14/2024 #90 with 0 refills    No future appointments.

## 2025-05-17 DIAGNOSIS — E66.812 CLASS 2 OBESITY DUE TO EXCESS CALORIES WITHOUT SERIOUS COMORBIDITY WITH BODY MASS INDEX (BMI) OF 37.0 TO 37.9 IN ADULT: ICD-10-CM

## 2025-05-17 DIAGNOSIS — E66.09 CLASS 2 OBESITY DUE TO EXCESS CALORIES WITHOUT SERIOUS COMORBIDITY WITH BODY MASS INDEX (BMI) OF 37.0 TO 37.9 IN ADULT: ICD-10-CM

## 2025-05-19 RX ORDER — METFORMIN HYDROCHLORIDE 750 MG/1
750 TABLET, EXTENDED RELEASE ORAL DAILY
Qty: 90 TABLET | Refills: 0 | Status: SHIPPED | OUTPATIENT
Start: 2025-05-19

## 2025-05-19 NOTE — TELEPHONE ENCOUNTER
MCM sent to establish care     Protocol failed     LOV: 6/20/24   RTC: 6 months  Filled: 11/22/24 #90 1 refill   Labs: 6/24/24   No future appointments.

## 2025-06-12 DIAGNOSIS — I10 ESSENTIAL HYPERTENSION: ICD-10-CM

## 2025-06-12 DIAGNOSIS — E78.00 HYPERCHOLESTEREMIA: ICD-10-CM

## 2025-06-12 RX ORDER — ATORVASTATIN CALCIUM 10 MG/1
10 TABLET, FILM COATED ORAL DAILY
Qty: 90 TABLET | Refills: 0 | Status: SHIPPED | OUTPATIENT
Start: 2025-06-12

## 2025-06-12 NOTE — TELEPHONE ENCOUNTER
Name from pharmacy: ATORVASTATIN 10 MG TABLET         Will file in chart as: ATORVASTATIN 10 MG Oral Tab    Sig: TAKE 1 TABLET BY MOUTH EVERY DAY    Disp: 90 tablet    Refills: 1    Start: 6/12/2025    Class: Normal    Non-formulary For: Hypercholesteremia    Last ordered: 6 months ago (12/14/2024) by France Mulligan DO    Last refill: 3/17/2025    Rx #: [id number]    Cholesterol Medication Protocol Snijbi9606/12/2025 12:12 AM   Protocol Details ALT < 80    ALT resulted within past year    Lipid panel within past 12 months    In person appointment or virtual visit in the past 12 mos or appointment in next 3 mos    Medication is active on med list      To be filled at: [facility]/pharmacy #[id number] [facility] [address] AT [facility], [phone], [phone]     LOV:  6/20/24  RTC: 6 months  Recent Labs:       Component  Ref Range & Units (hover) 6/24/24  7:01 AM   Cholesterol, Total 128   Comment: Desirable  <200 mg/dL  Borderline  200-239 mg/dL  High      >=240 mg/dL     HDL Cholesterol 30 Low    Comment: Interpretive Information:  An HDL cholesterol <40 mg/dL is low and constitutes a coronary heart disease risk factor. An HDL cholesterol >60 mg/dL is a negative risk factor for coronary heart disease.     Triglycerides 146   Comment: Reference interval for fasting triglycerides  Desirable: <150 mg/dL  Borderline: 150-199 mg/dL  High: 200-499 mg/dL  Very High: >=500 mg/dL       LDL Cholesterol 72   Comment: Optimal            <100 mg/dL  Near/Above OptimaL 100-129 mg/dL  Borderline High    130-159 mg/dL  High               160-189 mg/dL    Very High          >190 mg/dL     VLDL 22   Non HDL Chol 98   Comment: Desirable  <130 mg/dL  Borderline  130-159 mg/dL  High        160-189 mg/dL      Very high >=190 mg/dL     Patient Fasting for Lipid? Yes         Upcoming OV  none

## 2025-06-13 RX ORDER — AMLODIPINE AND BENAZEPRIL HYDROCHLORIDE 5; 20 MG/1; MG/1
1 CAPSULE ORAL DAILY
Qty: 30 CAPSULE | Refills: 0 | Status: SHIPPED | OUTPATIENT
Start: 2025-06-13

## 2025-06-13 NOTE — TELEPHONE ENCOUNTER
[facility] sent to pt to schedule BP f/u+ CPX coming due soon.  1 month sent in for mean time only    Amlodipine-benazepril 5-20 mg  Filled 3-17-25  Qty 90  0 refills  No future appointments.  LOV 6-20-24 VM

## 2025-08-16 DIAGNOSIS — E66.812 CLASS 2 OBESITY DUE TO EXCESS CALORIES WITHOUT SERIOUS COMORBIDITY WITH BODY MASS INDEX (BMI) OF 37.0 TO 37.9 IN ADULT: ICD-10-CM

## 2025-08-16 DIAGNOSIS — E66.09 CLASS 2 OBESITY DUE TO EXCESS CALORIES WITHOUT SERIOUS COMORBIDITY WITH BODY MASS INDEX (BMI) OF 37.0 TO 37.9 IN ADULT: ICD-10-CM

## 2025-08-18 RX ORDER — METFORMIN HYDROCHLORIDE 750 MG/1
750 TABLET, EXTENDED RELEASE ORAL DAILY
Qty: 30 TABLET | Refills: 0 | Status: SHIPPED | OUTPATIENT
Start: 2025-08-18

## (undated) DIAGNOSIS — I10 ESSENTIAL HYPERTENSION: ICD-10-CM

## (undated) DIAGNOSIS — R82.90 ABNORMAL URINE: Primary | ICD-10-CM

## (undated) DIAGNOSIS — E66.09 CLASS 2 OBESITY DUE TO EXCESS CALORIES WITHOUT SERIOUS COMORBIDITY WITH BODY MASS INDEX (BMI) OF 37.0 TO 37.9 IN ADULT: ICD-10-CM

## (undated) NOTE — LETTER
12/02/21        Scott Wegener  [address]   [address] [facility] [address]      Dear Eliot Orozco,    [facility] records indicate that you have outstanding lab work and or testing that was ordered for you and has not yet been completed:  Orders Placed This Encounter

## (undated) NOTE — LETTER
10/28/19        Scott Wegener  [address]   [place] [address]      Dear Radha Conroy,    [facility] records indicate that you have outstanding lab work and or testing that was ordered for you and has not yet been completed: Fasting lab work   *fast for at l

## (undated) NOTE — LETTER
07/26/21        Scott Wegener  [address]   [place] [address]      Dear Merline Peach,    [facility] records indicate that you have outstanding lab work and or testing that was ordered for you and has not yet been completed:  Orders Placed This Encounter